# Patient Record
Sex: FEMALE | Race: WHITE | NOT HISPANIC OR LATINO | Employment: OTHER | URBAN - METROPOLITAN AREA
[De-identification: names, ages, dates, MRNs, and addresses within clinical notes are randomized per-mention and may not be internally consistent; named-entity substitution may affect disease eponyms.]

---

## 2017-01-03 ENCOUNTER — HOSPITAL ENCOUNTER (OUTPATIENT)
Dept: INFUSION THERAPY | Facility: HOSPITAL | Age: 62
Discharge: HOME | End: 2017-01-03
Admitting: INTERNAL MEDICINE

## 2017-01-03 VITALS
TEMPERATURE: 98.3 F | OXYGEN SATURATION: 97 % | DIASTOLIC BLOOD PRESSURE: 80 MMHG | RESPIRATION RATE: 20 BRPM | BODY MASS INDEX: 29.08 KG/M2 | HEART RATE: 58 BPM | SYSTOLIC BLOOD PRESSURE: 128 MMHG | WEIGHT: 151.9 LBS

## 2017-01-03 DIAGNOSIS — K50.80 CROHN'S DISEASE OF BOTH SMALL AND LARGE INTESTINE WITHOUT COMPLICATION (HCC): ICD-10-CM

## 2017-01-03 PROCEDURE — 96365 THER/PROPH/DIAG IV INF INIT: CPT

## 2017-01-03 PROCEDURE — 96415 CHEMO IV INFUSION ADDL HR: CPT

## 2017-01-03 PROCEDURE — 25010000002 INFLIXIMAB PER 10 MG: Performed by: INTERNAL MEDICINE

## 2017-01-03 PROCEDURE — 96366 THER/PROPH/DIAG IV INF ADDON: CPT

## 2017-01-03 PROCEDURE — 96413 CHEMO IV INFUSION 1 HR: CPT

## 2017-01-03 RX ADMIN — INFLIXIMAB 400 MG: 100 INJECTION, POWDER, LYOPHILIZED, FOR SOLUTION INTRAVENOUS at 14:39

## 2017-01-25 ENCOUNTER — OFFICE VISIT (OUTPATIENT)
Dept: GASTROENTEROLOGY | Facility: CLINIC | Age: 62
End: 2017-01-25

## 2017-01-25 VITALS
BODY MASS INDEX: 29.45 KG/M2 | DIASTOLIC BLOOD PRESSURE: 76 MMHG | WEIGHT: 150 LBS | SYSTOLIC BLOOD PRESSURE: 128 MMHG | HEIGHT: 60 IN

## 2017-01-25 DIAGNOSIS — K50.80 CROHN'S DISEASE OF BOTH SMALL AND LARGE INTESTINE WITHOUT COMPLICATION (HCC): Primary | ICD-10-CM

## 2017-01-25 DIAGNOSIS — E53.8 VITAMIN B12 DEFICIENCY: ICD-10-CM

## 2017-01-25 PROCEDURE — 99213 OFFICE O/P EST LOW 20 MIN: CPT | Performed by: INTERNAL MEDICINE

## 2017-01-25 NOTE — PROGRESS NOTES
Chief Complaint   Patient presents with   • Crohn's Disease       Daisy Escobar is a  61 y.o. female here for a follow up visit for Crohn's disease that was diagnosed in 1979. 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 She was followed by Dr. Abdoulaye uTrcios. She was hospitalized at one point due to gallstones and was found to have what sounds like a partial small bowel obstruction. She was hospitalized for a month. She ended up having a cholecystectomy colon resection appendectomy and removal of an ovarian cyst during that hospitalization. She has not been on any medication for her bowels. She has not been seen for follow-up since 1993 after her surgery.      Since her last visit she's undergone a colonoscopy-path is as follows:  1: RIGHT COLON NEAR ANASTOMOSIS:  COLONIC MUCOSA WITH MILD ARCHITECTURAL DISTORTION AND PATCHY INCREASE OF  MONONUCLEAR CELLS IN THE LAMINA PROPRIA, NONSPECIFIC.      2: ASCENDING COLON BIOPSY:  COLONIC MUCOSA WITH PATCHY MILD INCREASE OF MONONUCLEAR CELLS IN THE LAMINA PROPRIA  AND FOCAL EDEMA, NONSPECIFIC.      3: LEFT COLON:  COLONIC MUCOSA WITH PATCHY MILD INCREASE OF MONONUCLEAR CELLS IN THE LAMINA PROPRIA  AND PATCHY EDEMA, NONSPECIFIC.      She has also undergone a small bowel follow-through:  CONCLUSION: Multiple stenotic and intervening saccular segments of  distal small bowel compatible with Crohn's disease, along with  abnormalities in the distribution of transverse and proximal descending  colon. 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 Correlate with history. Consider CT abdomen and pelvis  scanning once the current barium has been completely eliminated from the  Bowel..      She has tried imuran. She developed  the acute onset of burning epigastric pain that was escalating - she was nauseated and had indigestion type symptoms. She contacted us and we advised her to hold imuran and the symptoms resolved - when checked a few days later lipase was mildly elevated and other labs nml. TB/Hep B normal in 9/16.    She has had first 2 remicade infusions at [facility] and tolerated them fine. She feels about the same- 5-6 BM/d - no blood.  Weight stable. Did not tolerate entecort - made her feel like she had the flu.          HPI  Past Medical History   Diagnosis Date   • Crohn's disease      Past Surgical History   Procedure Laterality Date   • Colon resection     • Appendectomy     • Cholecystectomy open     • Ovarian cyst surgery     • Colonoscopy N/A 8/2/2016     stenotic edn to ileo colonic anastomsis, severe stenosis, a single erosion at colonic anastmosis, erythematous mucosa in colon, NBIH, patchy edema       Current Outpatient Prescriptions:   •  Calcium Carbonate (CALCIUM 600 PO), Take  by mouth., Disp: , Rfl:   •  Cyanocobalamin 1000 MCG/ML kit, Inject 1,000 mcg as directed every 30 (thirty) days., Disp: 1 kit, Rfl: 6  •  inFLIXimab (REMICADE) 100 MG injection, Infuse on wek 0,2 and 6 and then q 8 weeks thereafter, Disp: 34.3 mL, Rfl: 0  •  Multiple Vitamin (MULTI VITAMIN DAILY PO), Take  by mouth., Disp: , Rfl:   PRN Meds:.  Allergies   Allergen Reactions   • Azathioprine      Social History     Social History   • Marital status:      Spouse name: N/A   • Number of children: N/A   • Years of education: N/A     Occupational History   • Not on file.     Social History Main Topics   • Smoking status: Former Smoker     Packs/day: 0.25     Types: Cigarettes     Quit date: 1975   • Smokeless tobacco: Not on file   • Alcohol use No   • Drug use: No   • Sexual activity: Not on file     Other Topics Concern   • Not on file     Social History Narrative     History reviewed. No pertinent family history.  Review of Systems    Constitutional: Negative for chills, fever and unexpected weight change.   Respiratory: Negative for cough and shortness of breath.    Genitourinary: Negative for dysuria.     Vitals:    01/25/17 1336   BP: 128/76     Last Weight    01/25/17  1336   Weight: 150 lb (68 kg)     Physical Exam   Constitutional: She appears well-developed and well-nourished.   HENT:   Head: Normocephalic and atraumatic.   Eyes: No scleral icterus.   Abdominal: Soft. She exhibits no distension and no mass. There is no tenderness.   Neurological: She is alert.   Skin: Skin is warm and dry.   Psychiatric: She has a normal mood and affect.     No images are attached to the encounter.  Diagnoses and all orders for this visit:    Crohn's disease of both small and large intestine without complication  -     CBC & Differential  -     Comprehensive Metabolic Panel  -     Vitamin B12  -     Vitamin D 25 Hydroxy    Vitamin B12 deficiency    Plan:  - cont remicade - third dose next week  - UTD on vaccinations  - needs to derm for skin check  - labs today  - cont b12 replacement

## 2017-01-25 NOTE — MR AVS SNAPSHOT
Daisy Escobar   1/25/2017 1:30 PM   Office Visit    Dept Phone:  [phone]   Encounter #:  [id number]    Provider:  Patricia Patel MD   Department:  [facility]                Your Full Care Plan              Today's Medication Changes          These changes are accurate as of: 1/25/17  2:26 PM.  If you have any questions, ask your nurse or doctor.               Stop taking medication(s)listed here:     Budesonide 3 MG 24 hr capsule   Commonly known as:  ENTOCORT EC   Stopped by:  Patricia Patel MD                      Your Updated Medication List          This list is accurate as of: 1/25/17  2:26 PM.  Always use your most recent med list.                CALCIUM 600 PO       Cyanocobalamin 1000 MCG/ML kit   Inject 1,000 mcg as directed every 30 (thirty) days.       inFLIXimab 100 MG injection   Commonly known as:  REMICADE   Infuse on wek 0,2 and 6 and then q 8 weeks thereafter       MULTI VITAMIN DAILY PO               We Performed the Following     CBC & Differential     Comprehensive Metabolic Panel     Vitamin B12     Vitamin D 25 Hydroxy       You Were Diagnosed With        Codes Comments    Crohn's disease of both small and large intestine without complication    -  Primary ICD-10-CM: K50.80  ICD-9-CM: 555.2     Vitamin B12 deficiency     ICD-10-CM: E53.8  ICD-9-CM: 266.2       Instructions     None    Patient Instructions History      Upcoming Appointments     Visit Type Date Time Department    FOLLOW UP 1/25/2017  1:30 PM [facility]    REMICADE 2/1/2017  9:00 AM  [facility] INFU NON ONC    FOLLOW UP 4/27/2017 10:45 AM [facility]      MyChart Signup     Our records indicate that you have an active [employer] account.    You can view your After Visit Summary by going to [employer] and logging in with your [employer] username and password.  If you don't have a [employer] username and password but a parent  "or guardian has access to your record, the parent or guardian should login with their own [employer] username and password and access your record to view the After Visit Summary.    If you have questions, you can email [email or web address] or call [phone] to talk to our [employer] staff.  Remember, [employer] is NOT to be used for urgent needs.  For medical emergencies, dial 911.               Other Info from Your Visit           Your Appointments     Feb 01, 2017  9:00 AM EST   Remicade with ROOM 2 [facility]    [address] [address] [phone]            Apr 27, 2017 10:45 AM EDT   Follow Up with Patricia Patel MD   [facility] (--)    [address]  [facility] [place] [address] [phone]           Arrive 15 minutes prior to appointment.              Allergies     Azathioprine        Reason for Visit     Crohn's Disease           Vital Signs     Blood Pressure Height Weight Body Mass Index Smoking Status       128/76 60\" (152.4 cm) 150 lb (68 kg) 29.29 kg/m2 Former Smoker       Problems and Diagnoses Noted     Inflammatory bowel disease (Crohn's disease)    Vitamin B12 deficiency        "

## 2017-01-26 LAB
25(OH)D3+25(OH)D2 SERPL-MCNC: 28.8 NG/ML (ref 30–100)
ALBUMIN SERPL-MCNC: 4.4 G/DL (ref 3.5–5.2)
ALBUMIN/GLOB SERPL: 1.6 G/DL
ALP SERPL-CCNC: 53 U/L (ref 39–117)
ALT SERPL-CCNC: 17 U/L (ref 1–33)
AMYLASE SERPL-CCNC: 44 U/L (ref 28–100)
AST SERPL-CCNC: 15 U/L (ref 1–32)
BASOPHILS # BLD AUTO: 0.03 10*3/MM3 (ref 0–0.2)
BASOPHILS NFR BLD AUTO: 0.5 % (ref 0–1.5)
BILIRUB SERPL-MCNC: 1.5 MG/DL (ref 0.1–1.2)
BUN SERPL-MCNC: 10 MG/DL (ref 8–23)
BUN/CREAT SERPL: 12.5 (ref 7–25)
CALCIUM SERPL-MCNC: 9.9 MG/DL (ref 8.6–10.5)
CHLORIDE SERPL-SCNC: 100 MMOL/L (ref 98–107)
CO2 SERPL-SCNC: 26.9 MMOL/L (ref 22–29)
CREAT SERPL-MCNC: 0.8 MG/DL (ref 0.57–1)
EOSINOPHIL # BLD AUTO: 0.12 10*3/MM3 (ref 0–0.7)
EOSINOPHIL NFR BLD AUTO: 2 % (ref 0.3–6.2)
ERYTHROCYTE [DISTWIDTH] IN BLOOD BY AUTOMATED COUNT: 13.6 % (ref 11.7–13)
GLOBULIN SER CALC-MCNC: 2.8 GM/DL
GLUCOSE SERPL-MCNC: 98 MG/DL (ref 65–99)
HCT VFR BLD AUTO: 43.6 % (ref 35.6–45.5)
HGB BLD-MCNC: 14.6 G/DL (ref 11.9–15.5)
IMM GRANULOCYTES # BLD: 0 10*3/MM3 (ref 0–0.03)
IMM GRANULOCYTES NFR BLD: 0 % (ref 0–0.5)
LIPASE SERPL-CCNC: 48 U/L (ref 13–60)
LYMPHOCYTES # BLD AUTO: 2.04 10*3/MM3 (ref 0.9–4.8)
LYMPHOCYTES NFR BLD AUTO: 33.7 % (ref 19.6–45.3)
MCH RBC QN AUTO: 29.4 PG (ref 26.9–32)
MCHC RBC AUTO-ENTMCNC: 33.5 G/DL (ref 32.4–36.3)
MCV RBC AUTO: 87.7 FL (ref 80.5–98.2)
MONOCYTES # BLD AUTO: 0.33 10*3/MM3 (ref 0.2–1.2)
MONOCYTES NFR BLD AUTO: 5.5 % (ref 5–12)
NEUTROPHILS # BLD AUTO: 3.53 10*3/MM3 (ref 1.9–8.1)
NEUTROPHILS NFR BLD AUTO: 58.3 % (ref 42.7–76)
PLATELET # BLD AUTO: 248 10*3/MM3 (ref 140–500)
POTASSIUM SERPL-SCNC: 4.1 MMOL/L (ref 3.5–5.2)
PROT SERPL-MCNC: 7.2 G/DL (ref 6–8.5)
RBC # BLD AUTO: 4.97 10*6/MM3 (ref 3.9–5.2)
SODIUM SERPL-SCNC: 141 MMOL/L (ref 136–145)
VIT B12 SERPL-MCNC: 247 PG/ML (ref 211–946)
WBC # BLD AUTO: 6.05 10*3/MM3 (ref 4.5–10.7)

## 2017-01-27 ENCOUNTER — TELEPHONE (OUTPATIENT)
Dept: GASTROENTEROLOGY | Facility: CLINIC | Age: 62
End: 2017-01-27

## 2017-01-27 NOTE — TELEPHONE ENCOUNTER
Called pt and on vm advised per Dr Patel that her labs are normal and to call with any questions.

## 2017-01-30 ENCOUNTER — TELEPHONE (OUTPATIENT)
Dept: GASTROENTEROLOGY | Facility: CLINIC | Age: 62
End: 2017-01-30

## 2017-01-30 NOTE — TELEPHONE ENCOUNTER
Called pt and pt is asking about a bill regarding her remicaid in fusion at [facility].  Advised the pt to call [facility] billing.  Pt verb understanding.

## 2017-01-30 NOTE — TELEPHONE ENCOUNTER
----- Message from Jan Luis sent at 1/30/2017  8:58 AM EST -----  Regarding: PT CALLED ABOUT INSURNACE  Contact: [phone]

## 2017-02-01 ENCOUNTER — HOSPITAL ENCOUNTER (OUTPATIENT)
Dept: INFUSION THERAPY | Facility: HOSPITAL | Age: 62
Discharge: HOME | End: 2017-02-01
Admitting: INTERNAL MEDICINE

## 2017-02-01 VITALS
HEART RATE: 80 BPM | TEMPERATURE: 97.6 F | BODY MASS INDEX: 29.29 KG/M2 | RESPIRATION RATE: 20 BRPM | OXYGEN SATURATION: 96 % | SYSTOLIC BLOOD PRESSURE: 110 MMHG | DIASTOLIC BLOOD PRESSURE: 73 MMHG | WEIGHT: 150 LBS

## 2017-02-01 DIAGNOSIS — K50.80 CROHN'S DISEASE OF BOTH SMALL AND LARGE INTESTINE WITHOUT COMPLICATION (HCC): ICD-10-CM

## 2017-02-01 PROCEDURE — 96366 THER/PROPH/DIAG IV INF ADDON: CPT

## 2017-02-01 PROCEDURE — 25010000002 INFLIXIMAB PER 10 MG: Performed by: INTERNAL MEDICINE

## 2017-02-01 PROCEDURE — 96413 CHEMO IV INFUSION 1 HR: CPT

## 2017-02-01 PROCEDURE — 96365 THER/PROPH/DIAG IV INF INIT: CPT

## 2017-02-01 PROCEDURE — 96415 CHEMO IV INFUSION ADDL HR: CPT

## 2017-02-01 RX ADMIN — INFLIXIMAB 400 MG: 100 INJECTION, POWDER, LYOPHILIZED, FOR SOLUTION INTRAVENOUS at 09:53

## 2017-03-17 RX ORDER — CYANOCOBALAMIN 1000 UG/ML
INJECTION, SOLUTION INTRAMUSCULAR; SUBCUTANEOUS
Qty: 1 ML | Refills: 5 | Status: SHIPPED | OUTPATIENT
Start: 2017-03-17 | End: 2018-06-28

## 2017-03-27 ENCOUNTER — TELEPHONE (OUTPATIENT)
Dept: GASTROENTEROLOGY | Facility: CLINIC | Age: 62
End: 2017-03-27

## 2017-03-27 NOTE — TELEPHONE ENCOUNTER
----- Message from Maximo Bell sent at 3/27/2017 12:10 PM EDT -----  Regarding: Remicade infusion  Contact: [phone]  Cliff TORRES is calling regarding authorization for Remicade infusion on 3/29.

## 2017-03-27 NOTE — TELEPHONE ENCOUNTER
Called [phone] and spoke with Mitzi and obtained pre cert for Remicaid 5mg / kg Iv every 8 wks.  Auth number is [id number] and is good from 03/29/2017- 02/28/2018.    Called Cliff and advised of the above

## 2017-03-29 ENCOUNTER — HOSPITAL ENCOUNTER (OUTPATIENT)
Dept: INFUSION THERAPY | Facility: HOSPITAL | Age: 62
Discharge: HOME | End: 2017-03-29
Admitting: INTERNAL MEDICINE

## 2017-03-29 VITALS
HEART RATE: 67 BPM | BODY MASS INDEX: 30.14 KG/M2 | TEMPERATURE: 96.6 F | OXYGEN SATURATION: 98 % | RESPIRATION RATE: 20 BRPM | SYSTOLIC BLOOD PRESSURE: 125 MMHG | WEIGHT: 154.32 LBS | DIASTOLIC BLOOD PRESSURE: 72 MMHG

## 2017-03-29 DIAGNOSIS — K50.80 CROHN'S DISEASE OF BOTH SMALL AND LARGE INTESTINE WITHOUT COMPLICATION (HCC): ICD-10-CM

## 2017-03-29 PROCEDURE — 96415 CHEMO IV INFUSION ADDL HR: CPT

## 2017-03-29 PROCEDURE — 96365 THER/PROPH/DIAG IV INF INIT: CPT

## 2017-03-29 PROCEDURE — 96366 THER/PROPH/DIAG IV INF ADDON: CPT

## 2017-03-29 PROCEDURE — 96413 CHEMO IV INFUSION 1 HR: CPT

## 2017-03-29 PROCEDURE — 25010000002 INFLIXIMAB PER 10 MG: Performed by: INTERNAL MEDICINE

## 2017-03-29 RX ADMIN — INFLIXIMAB 400 MG: 100 INJECTION, POWDER, LYOPHILIZED, FOR SOLUTION INTRAVENOUS at 09:30

## 2017-03-30 PROCEDURE — 25010000002 INFLIXIMAB PER 10 MG: Performed by: INTERNAL MEDICINE

## 2017-04-27 ENCOUNTER — TELEPHONE (OUTPATIENT)
Dept: GASTROENTEROLOGY | Facility: CLINIC | Age: 62
End: 2017-04-27

## 2017-04-27 ENCOUNTER — OFFICE VISIT (OUTPATIENT)
Dept: GASTROENTEROLOGY | Facility: CLINIC | Age: 62
End: 2017-04-27

## 2017-04-27 VITALS
HEIGHT: 60 IN | WEIGHT: 153.4 LBS | BODY MASS INDEX: 30.12 KG/M2 | SYSTOLIC BLOOD PRESSURE: 124 MMHG | DIASTOLIC BLOOD PRESSURE: 72 MMHG

## 2017-04-27 DIAGNOSIS — E53.8 VITAMIN B12 DEFICIENCY: ICD-10-CM

## 2017-04-27 DIAGNOSIS — K50.00 CROHN'S DISEASE OF SMALL INTESTINE WITHOUT COMPLICATION (HCC): Primary | ICD-10-CM

## 2017-04-27 DIAGNOSIS — E55.9 VITAMIN D DEFICIENCY: ICD-10-CM

## 2017-04-27 DIAGNOSIS — M95.2 SKULL DEFORMITY: ICD-10-CM

## 2017-04-27 PROCEDURE — 99214 OFFICE O/P EST MOD 30 MIN: CPT | Performed by: INTERNAL MEDICINE

## 2017-04-27 RX ORDER — ERGOCALCIFEROL 1.25 MG/1
50000 CAPSULE ORAL WEEKLY
Qty: 4 CAPSULE | Refills: 11 | Status: SHIPPED | OUTPATIENT
Start: 2017-04-27 | End: 2017-11-08 | Stop reason: ALTCHOICE

## 2017-04-27 NOTE — TELEPHONE ENCOUNTER
It is reported that pt copay for Remicade is $3500/yr, and $150 for each infusion.  Call to [facility] @ [phone] and spoke with Lucy JONES to check expense.  Lucy esqueda may be due to deductible - check with plan benefits.      Call to [facility] @ [phone] - on hold for 15 min - no answer.    Call to [facility] and spoke with Ina - she reports that with DOS 1/3/17 - pt's cost of $2750 counted toward deductible.  $290.99 is pt's copay.  On 4/13/17 - pt's coinsurance was $703.49.    Call to pt.  She reports her deductible is $3500, and then 80/20 %.  Instruct pt to contact insurance to ascertain expenses and notify this office.  Pt states will do so.    VM to Remicade Reji adams, @ [phone] to check if any pt assistance program available.  Awaiting reply.

## 2017-04-27 NOTE — PROGRESS NOTES
Chief Complaint   Patient presents with   • Follow-up   • Crohn's Disease       Daisy Escobar is a  61 y.o. female here for a follow up visit for Crohn's disease that was diagnosed in 1979. 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 She was followed by Dr. Abdoulaye Turcios. She was hospitalized at one point due to gallstones and was found to have what sounds like a partial small bowel obstruction. She was hospitalized for a month. She ended up having a cholecystectomy colon resection appendectomy and removal of an ovarian cyst during that hospitalization. She has not been on any medication for her bowels. She has not been seen for follow-up since 1993 after her surgery.      Since her last visit she's undergone a colonoscopy-path is as follows:  1: RIGHT COLON NEAR ANASTOMOSIS:  COLONIC MUCOSA WITH MILD ARCHITECTURAL DISTORTION AND PATCHY INCREASE OF  MONONUCLEAR CELLS IN THE LAMINA PROPRIA, NONSPECIFIC.      2: ASCENDING COLON BIOPSY:  COLONIC MUCOSA WITH PATCHY MILD INCREASE OF MONONUCLEAR CELLS IN THE LAMINA PROPRIA  AND FOCAL EDEMA, NONSPECIFIC.      3: LEFT COLON:  COLONIC MUCOSA WITH PATCHY MILD INCREASE OF MONONUCLEAR CELLS IN THE LAMINA PROPRIA  AND PATCHY EDEMA, NONSPECIFIC.      She has also undergone a small bowel follow-through:  CONCLUSION: Multiple stenotic and intervening saccular segments of  distal small bowel compatible with Crohn's disease, along with  abnormalities in the distribution of transverse and proximal descending  colon. 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 Correlate with history. Consider CT abdomen and pelvis  scanning once the current barium has been completely eliminated from the  Bowel.       She has tried imuran.  She developed the acute onset of burning epigastric pain that was escalating - she was nauseated and had indigestion type symptoms. She contacted us and we advised her to hold imuran and the symptoms resolved - when checked a few days later lipase was mildly elevated and other labs nml. TB/Hep B normal in 9/16.   Did not tolerate entecort - made her feel like she had the flu.      She is completed the loading phase of Remicade.  She does feel like the urgency to have a bowel movement after eating has improved.  Her frequency is about the same.  She averages 5-6 bowel movements daily.  She has no abdominal pain.  Her weight is stable.  She's had no fevers or chills.  She did notice a palpable nodule on the back of her skull posterior to her left ear.  She's not sure how long it's been there.  It doesn't hurt when it's palpated.    HPI  Past Medical History:   Diagnosis Date   • Crohn's disease      Past Surgical History:   Procedure Laterality Date   • APPENDECTOMY     • CHOLECYSTECTOMY OPEN     • COLON RESECTION     • COLONOSCOPY N/A 8/2/2016    stenotic edn to ileo colonic anastomsis, severe stenosis, a single erosion at colonic anastmosis, erythematous mucosa in colon, NBIH, patchy edema   • OVARIAN CYST SURGERY         Current Outpatient Prescriptions:   •  Calcium Carbonate (CALCIUM 600 PO), Take  by mouth Daily., Disp: , Rfl:   •  cyanocobalamin 1000 MCG/ML injection, INJECT 1 ML AS DIRECTED ONCE A MONTH, Disp: 1 mL, Rfl: 5  •  inFLIXimab (REMICADE) 100 MG injection, Infuse on wek 0,2 and 6 and then q 8 weeks thereafter, Disp: 34.3 mL, Rfl: 0  •  Multiple Vitamin (MULTI VITAMIN DAILY PO), Take  by mouth Daily., Disp: , Rfl:   •  vitamin D (ERGOCALCIFEROL) 09158 UNITS capsule capsule, Take 1 capsule by mouth 1 (One) Time Per Week., Disp: 4 capsule, Rfl: 11  PRN Meds:.  Allergies   Allergen Reactions   • Azathioprine      Social History     Social History   • Marital status:      Spouse name: N/A   •  Number of children: N/A   • Years of education: N/A     Occupational History   • Not on file.     Social History Main Topics   • Smoking status: Former Smoker     Packs/day: 0.25     Types: Cigarettes     Quit date: 1975   • Smokeless tobacco: Not on file   • Alcohol use No   • Drug use: No   • Sexual activity: Defer     Other Topics Concern   • Not on file     Social History Narrative     History reviewed. No pertinent family history.  Review of Systems   Constitutional: Negative for fever and unexpected weight change.   Respiratory: Negative for cough and shortness of breath.    Gastrointestinal: Positive for diarrhea. Negative for blood in stool.     Vitals:    04/27/17 1034   BP: 124/72     Last Weight    04/27/17  1034   Weight: 153 lb 6.4 oz (69.6 kg)     Physical Exam   Constitutional: She is oriented to person, place, and time. She appears well-developed and well-nourished.   HENT:   Head: Normocephalic and atraumatic.   2 cm hard nodule posterior to her left ear, nonmobile nontender   Eyes: Conjunctivae are normal. No scleral icterus.   Neck: Normal range of motion. Neck supple.   Pulmonary/Chest: Effort normal.   Abdominal: Soft. Bowel sounds are normal. She exhibits no distension. There is no tenderness.   Musculoskeletal: She exhibits no edema.   Neurological: She is alert and oriented to person, place, and time.   Skin: Skin is warm and dry.   Psychiatric: She has a normal mood and affect.   Nursing note and vitals reviewed.    No images are attached to the encounter.  Diagnoses and all orders for this visit:    Crohn's disease of small intestine without complication    Skull deformity  -     Cancel: CT head wo contrast; Future  -     XR skull < 4 vw; Future    Vitamin D deficiency    Vitamin B12 deficiency    Other orders  -     vitamin D (ERGOCALCIFEROL) 04600 UNITS capsule capsule; Take 1 capsule by mouth 1 (One) Time Per Week.      Plan-  - Continue Remicade for now.  We may have to find an  alternate biologic as this medication is costing her an extraordinary amount of money.  I will have my nurse is looking into whether this is being built appropriately or if there is any cheaper way to have this medication delivered to her.  Otherwise we may have to consider switching her to Cimzia or Humira.  - We'll start weekly vitamin D replacement.  Continue vitamin B12 replacement.  - Going to get an x-ray of her skull to further evaluate the nodule

## 2017-04-27 NOTE — TELEPHONE ENCOUNTER
Reji brought info re: [facility] .  Call to  and spoke with Bita.  She requests that saving paperwork be completed.  2016/2017 savings program form downloaded.  Call to pt.  Authorization received to sign for pt.  Form completed and faxed to  - confirmation received.

## 2017-05-08 ENCOUNTER — HOSPITAL ENCOUNTER (OUTPATIENT)
Dept: GENERAL RADIOLOGY | Facility: HOSPITAL | Age: 62
Discharge: HOME | End: 2017-05-08
Attending: INTERNAL MEDICINE | Admitting: INTERNAL MEDICINE

## 2017-05-08 ENCOUNTER — APPOINTMENT (OUTPATIENT)
Dept: WOMENS IMAGING | Facility: HOSPITAL | Age: 62
End: 2017-05-08

## 2017-05-08 DIAGNOSIS — M95.2 SKULL DEFORMITY: ICD-10-CM

## 2017-05-08 PROCEDURE — 70250 X-RAY EXAM OF SKULL: CPT

## 2017-05-08 PROCEDURE — 77067 SCR MAMMO BI INCL CAD: CPT | Performed by: RADIOLOGY

## 2017-05-09 ENCOUNTER — TELEPHONE (OUTPATIENT)
Dept: GASTROENTEROLOGY | Facility: CLINIC | Age: 62
End: 2017-05-09

## 2017-05-24 ENCOUNTER — HOSPITAL ENCOUNTER (OUTPATIENT)
Dept: INFUSION THERAPY | Facility: HOSPITAL | Age: 62
Discharge: HOME | End: 2017-05-24
Admitting: INTERNAL MEDICINE

## 2017-05-24 VITALS
RESPIRATION RATE: 16 BRPM | HEART RATE: 69 BPM | BODY MASS INDEX: 30.33 KG/M2 | SYSTOLIC BLOOD PRESSURE: 134 MMHG | HEIGHT: 60 IN | OXYGEN SATURATION: 98 % | WEIGHT: 154.5 LBS | DIASTOLIC BLOOD PRESSURE: 66 MMHG | TEMPERATURE: 96.9 F

## 2017-05-24 DIAGNOSIS — K50.80 CROHN'S DISEASE OF BOTH SMALL AND LARGE INTESTINE WITHOUT COMPLICATION (HCC): ICD-10-CM

## 2017-05-24 PROCEDURE — 96365 THER/PROPH/DIAG IV INF INIT: CPT

## 2017-05-24 PROCEDURE — 96413 CHEMO IV INFUSION 1 HR: CPT

## 2017-05-24 PROCEDURE — 25010000002 INFLIXIMAB PER 10 MG: Performed by: INTERNAL MEDICINE

## 2017-05-24 PROCEDURE — 96366 THER/PROPH/DIAG IV INF ADDON: CPT

## 2017-05-24 PROCEDURE — 96415 CHEMO IV INFUSION ADDL HR: CPT

## 2017-05-24 RX ADMIN — INFLIXIMAB 300 MG: 100 INJECTION, POWDER, LYOPHILIZED, FOR SOLUTION INTRAVENOUS at 09:10

## 2017-05-25 PROCEDURE — 25010000002 INFLIXIMAB PER 10 MG: Performed by: INTERNAL MEDICINE

## 2017-07-19 ENCOUNTER — HOSPITAL ENCOUNTER (OUTPATIENT)
Dept: INFUSION THERAPY | Facility: HOSPITAL | Age: 62
Discharge: HOME | End: 2017-07-19
Admitting: INTERNAL MEDICINE

## 2017-07-19 VITALS
HEART RATE: 63 BPM | HEIGHT: 60 IN | DIASTOLIC BLOOD PRESSURE: 78 MMHG | SYSTOLIC BLOOD PRESSURE: 133 MMHG | OXYGEN SATURATION: 96 % | TEMPERATURE: 96.6 F | BODY MASS INDEX: 30.98 KG/M2 | WEIGHT: 157.8 LBS | RESPIRATION RATE: 12 BRPM

## 2017-07-19 DIAGNOSIS — K50.80 CROHN'S DISEASE OF BOTH SMALL AND LARGE INTESTINE WITHOUT COMPLICATION (HCC): ICD-10-CM

## 2017-07-19 PROCEDURE — 96415 CHEMO IV INFUSION ADDL HR: CPT

## 2017-07-19 PROCEDURE — 96365 THER/PROPH/DIAG IV INF INIT: CPT

## 2017-07-19 PROCEDURE — 25010000002 INFLIXIMAB PER 10 MG: Performed by: INTERNAL MEDICINE

## 2017-07-19 PROCEDURE — 96413 CHEMO IV INFUSION 1 HR: CPT

## 2017-07-19 PROCEDURE — 96366 THER/PROPH/DIAG IV INF ADDON: CPT

## 2017-07-19 RX ADMIN — INFLIXIMAB 300 MG: 100 INJECTION, POWDER, LYOPHILIZED, FOR SOLUTION INTRAVENOUS at 08:40

## 2017-09-06 ENCOUNTER — OFFICE VISIT (OUTPATIENT)
Dept: GASTROENTEROLOGY | Facility: CLINIC | Age: 62
End: 2017-09-06

## 2017-09-06 VITALS
SYSTOLIC BLOOD PRESSURE: 134 MMHG | HEIGHT: 60 IN | BODY MASS INDEX: 30.9 KG/M2 | DIASTOLIC BLOOD PRESSURE: 82 MMHG | WEIGHT: 157.4 LBS | TEMPERATURE: 98.6 F

## 2017-09-06 DIAGNOSIS — E55.9 VITAMIN D DEFICIENCY DISEASE: ICD-10-CM

## 2017-09-06 DIAGNOSIS — R19.7 DIARRHEA, UNSPECIFIED TYPE: ICD-10-CM

## 2017-09-06 DIAGNOSIS — E53.8 VITAMIN B12 DEFICIENCY: ICD-10-CM

## 2017-09-06 DIAGNOSIS — K50.00 CROHN'S DISEASE OF SMALL INTESTINE WITHOUT COMPLICATION (HCC): Primary | ICD-10-CM

## 2017-09-06 PROCEDURE — 99214 OFFICE O/P EST MOD 30 MIN: CPT | Performed by: INTERNAL MEDICINE

## 2017-09-06 RX ORDER — DOXYCYCLINE HYCLATE 20 MG
TABLET ORAL
COMMUNITY
Start: 2017-07-31 | End: 2017-11-08

## 2017-09-06 NOTE — PROGRESS NOTES
Chief Complaint   Patient presents with   • Crohn's Disease       Daisy Escobar is a  62 y.o. female here for a follow up visit for Crohn's ileitis. She has been on Remicade now for about a year.  She doesn't notice much change since she started this medication.  She has about 5 bowel movements daily on average.  No blood in the stool.  Occasional urgency.  She sometimes has cramping.  Appetite is good.  No bloating.  She complains of sore joints specifically her elbows in her knees.  No recent can change or rash.  No recent infections, fevers chills.  She is up-to-date on her dermatologic evaluations.  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, severe stenosis, a single erosion at colonic anastmosis, erythematous mucosa in colon, NBIH, patchy edema   • OVARIAN CYST SURGERY         Current Outpatient Prescriptions:   •  Calcium Carbonate (CALCIUM 600 PO), Take  by mouth Daily., Disp: , Rfl:   •  cyanocobalamin 1000 MCG/ML injection, INJECT 1 ML AS DIRECTED ONCE A MONTH, Disp: 1 mL, Rfl: 5  •  inFLIXimab (REMICADE) 100 MG injection, Infuse on wek 0,2 and 6 and then q 8 weeks thereafter, Disp: 34.3 mL, Rfl: 0  •  vitamin D (ERGOCALCIFEROL) 34569 UNITS capsule capsule, Take 1 capsule by mouth 1 (One) Time Per Week., Disp: 4 capsule, Rfl: 11  •  doxycycline (PERIOSTAT) 20 MG tablet, , Disp: , Rfl:   •  hyoscyamine (LEVSIN) 0.125 MG SL tablet, Take 1 tablet by mouth Every 4 (Four) Hours As Needed for Cramping., Disp: 60 tablet, Rfl: 3  •  Multiple Vitamin (MULTI VITAMIN DAILY PO), Take  by mouth Daily., Disp: , Rfl:   PRN Meds:.  Allergies   Allergen Reactions   • Azathioprine      Social History     Social History   • Marital status:      Spouse name: N/A   • Number of children: N/A   • Years  of education: N/A     Occupational History   • Not on file.     Social History Main Topics   • Smoking status: Former Smoker     Packs/day: 0.25     Types: Cigarettes     Quit date: 1975   • Smokeless tobacco: Not on file   • Alcohol use No   • Drug use: No   • Sexual activity: Defer     Other Topics Concern   • Not on file     Social History Narrative     History reviewed. No pertinent family history.  Review of Systems   Constitutional: Negative for appetite change, chills, fever and unexpected weight change.   Eyes: Negative.    Gastrointestinal: Positive for diarrhea. Negative for abdominal pain and blood in stool.   Musculoskeletal: Positive for arthralgias.   Skin: Negative.    All other systems reviewed and are negative.    Vitals:    09/06/17 1455   BP: 134/82   Temp: 98.6 °F (37 °C)     Last Weight    09/06/17  1455   Weight: 157 lb 6.4 oz (71.4 kg)     Physical Exam   Constitutional: She appears well-developed and well-nourished.   HENT:   Head: Normocephalic and atraumatic.   Eyes: No scleral icterus.   Abdominal: Soft. She exhibits no distension and no mass. There is no tenderness.   Neurological: She is alert.   Skin: Skin is warm and dry.   Psychiatric: She has a normal mood and affect.     No images are attached to the encounter.  Diagnoses and all orders for this visit:    Crohn's disease of small intestine without complication  -     CBC & Differential  -     Comprehensive Metabolic Panel  -     Vitamin B12  -     Vitamin D 25 Hydroxy  -     QuantiFERON TB Gold  -     Hepatitis B Surface Antigen  -     FL small bowel follow through; Future    Vitamin D deficiency disease    Vitamin B12 deficiency    Diarrhea, unspecified type    Other orders  -     doxycycline (PERIOSTAT) 20 MG tablet;   -     hyoscyamine (LEVSIN) 0.125 MG SL tablet; Take 1 tablet by mouth Every 4 (Four) Hours As Needed for Cramping.       Plan:  - get flu vaccine when available  - schedule dexa (no prior evaluation)  - continue Vit  D  - labs today  - repeat sbft to reassess bowel disease (1 year on remicade)  - repeat TB/Hep B tests

## 2017-09-10 LAB
25(OH)D3+25(OH)D2 SERPL-MCNC: 44.3 NG/ML (ref 30–100)
ALBUMIN SERPL-MCNC: 4.4 G/DL (ref 3.6–4.8)
ALBUMIN/GLOB SERPL: 1.6 {RATIO} (ref 1.2–2.2)
ALP SERPL-CCNC: 58 IU/L (ref 39–117)
ALT SERPL-CCNC: 19 IU/L (ref 0–32)
ANNOTATION COMMENT IMP: NORMAL
AST SERPL-CCNC: 18 IU/L (ref 0–40)
BASOPHILS # BLD AUTO: 0 X10E3/UL (ref 0–0.2)
BASOPHILS NFR BLD AUTO: 0 %
BILIRUB SERPL-MCNC: 1.5 MG/DL (ref 0–1.2)
BUN SERPL-MCNC: 10 MG/DL (ref 8–27)
BUN/CREAT SERPL: 13 (ref 12–28)
CALCIUM SERPL-MCNC: 9.4 MG/DL (ref 8.7–10.3)
CHLORIDE SERPL-SCNC: 99 MMOL/L (ref 96–106)
CO2 SERPL-SCNC: 22 MMOL/L (ref 18–29)
CREAT SERPL-MCNC: 0.79 MG/DL (ref 0.57–1)
EOSINOPHIL # BLD AUTO: 0.1 X10E3/UL (ref 0–0.4)
EOSINOPHIL NFR BLD AUTO: 2 %
ERYTHROCYTE [DISTWIDTH] IN BLOOD BY AUTOMATED COUNT: 14 % (ref 12.3–15.4)
GAMMA INTERFERON BACKGROUND BLD IA-ACNC: 0.08 IU/ML
GLOBULIN SER CALC-MCNC: 2.8 G/DL (ref 1.5–4.5)
GLUCOSE SERPL-MCNC: 102 MG/DL (ref 65–99)
HBV SURFACE AG SERPL QL IA: NEGATIVE
HCT VFR BLD AUTO: 39.7 % (ref 34–46.6)
HGB BLD-MCNC: 13.8 G/DL (ref 11.1–15.9)
IMM GRANULOCYTES # BLD: 0 X10E3/UL (ref 0–0.1)
IMM GRANULOCYTES NFR BLD: 0 %
LYMPHOCYTES # BLD AUTO: 1.6 X10E3/UL (ref 0.7–3.1)
LYMPHOCYTES NFR BLD AUTO: 28 %
M TB IFN-G BLD-IMP: NEGATIVE
M TB IFN-G CD4+ BCKGRND COR BLD-ACNC: 0.23 IU/ML
M TB IFN-G CD4+ T-CELLS BLD-ACNC: 0.31 IU/ML
MCH RBC QN AUTO: 29.5 PG (ref 26.6–33)
MCHC RBC AUTO-ENTMCNC: 34.8 G/DL (ref 31.5–35.7)
MCV RBC AUTO: 85 FL (ref 79–97)
MITOGEN IGNF BLD-ACNC: >10 IU/ML
MONOCYTES # BLD AUTO: 0.4 X10E3/UL (ref 0.1–0.9)
MONOCYTES NFR BLD AUTO: 7 %
NEUTROPHILS # BLD AUTO: 3.7 X10E3/UL (ref 1.4–7)
NEUTROPHILS NFR BLD AUTO: 63 %
PLATELET # BLD AUTO: 240 X10E3/UL (ref 150–379)
POTASSIUM SERPL-SCNC: 3.8 MMOL/L (ref 3.5–5.2)
PROT SERPL-MCNC: 7.2 G/DL (ref 6–8.5)
QUANTIFERON INCUBATION: NORMAL
RBC # BLD AUTO: 4.68 X10E6/UL (ref 3.77–5.28)
SERVICE CMNT-IMP: NORMAL
SODIUM SERPL-SCNC: 140 MMOL/L (ref 134–144)
VIT B12 SERPL-MCNC: 697 PG/ML (ref 211–946)
WBC # BLD AUTO: 5.8 X10E3/UL (ref 3.4–10.8)

## 2017-09-13 ENCOUNTER — HOSPITAL ENCOUNTER (OUTPATIENT)
Dept: INFUSION THERAPY | Facility: HOSPITAL | Age: 62
Discharge: HOME | End: 2017-09-13
Admitting: INTERNAL MEDICINE

## 2017-09-13 VITALS
RESPIRATION RATE: 20 BRPM | TEMPERATURE: 95.5 F | OXYGEN SATURATION: 96 % | HEART RATE: 64 BPM | BODY MASS INDEX: 30.66 KG/M2 | SYSTOLIC BLOOD PRESSURE: 136 MMHG | WEIGHT: 157 LBS | DIASTOLIC BLOOD PRESSURE: 81 MMHG

## 2017-09-13 DIAGNOSIS — K50.80 CROHN'S DISEASE OF BOTH SMALL AND LARGE INTESTINE WITHOUT COMPLICATION (HCC): ICD-10-CM

## 2017-09-13 PROCEDURE — 96366 THER/PROPH/DIAG IV INF ADDON: CPT

## 2017-09-13 PROCEDURE — 25010000002 INFLIXIMAB PER 10 MG: Performed by: INTERNAL MEDICINE

## 2017-09-13 PROCEDURE — 96365 THER/PROPH/DIAG IV INF INIT: CPT

## 2017-09-13 RX ADMIN — INFLIXIMAB 400 MG: 100 INJECTION, POWDER, LYOPHILIZED, FOR SOLUTION INTRAVENOUS at 09:09

## 2017-09-13 NOTE — PROGRESS NOTES
Pt tolerated infusion with no complaints or S/S of reaction.  Monitored for 30 minutes post infusion per protocol.  Pt D/C per ambulation @ 9270.

## 2017-09-14 PROCEDURE — 25010000002 INFLIXIMAB PER 10 MG: Performed by: INTERNAL MEDICINE

## 2017-09-18 ENCOUNTER — TELEPHONE (OUTPATIENT)
Dept: GASTROENTEROLOGY | Facility: CLINIC | Age: 62
End: 2017-09-18

## 2017-09-18 NOTE — TELEPHONE ENCOUNTER
Call from pt.  Advise per Dr Patel that labwork normal.  TB/Hep B negative.  Vitamin D has improved - can stop supplement for now.  Pt verb understanding.

## 2017-09-18 NOTE — TELEPHONE ENCOUNTER
----- Message from Patricia Patel MD sent at 9/18/2017 12:35 PM EDT -----  Labwork normal - TB/Hepatitis B negative.  Vitamin D has improved - she can stop supplement for now

## 2017-09-19 ENCOUNTER — APPOINTMENT (OUTPATIENT)
Dept: BONE DENSITY | Facility: HOSPITAL | Age: 62
End: 2017-09-19
Attending: INTERNAL MEDICINE

## 2017-09-19 ENCOUNTER — HOSPITAL ENCOUNTER (OUTPATIENT)
Dept: GENERAL RADIOLOGY | Facility: HOSPITAL | Age: 62
Discharge: HOME | End: 2017-09-19
Attending: INTERNAL MEDICINE | Admitting: INTERNAL MEDICINE

## 2017-09-19 DIAGNOSIS — K50.00 CROHN'S DISEASE OF SMALL INTESTINE WITHOUT COMPLICATION (HCC): ICD-10-CM

## 2017-09-19 PROCEDURE — 74250 X-RAY XM SM INT 1CNTRST STD: CPT

## 2017-09-26 ENCOUNTER — TELEPHONE (OUTPATIENT)
Dept: GASTROENTEROLOGY | Facility: CLINIC | Age: 62
End: 2017-09-26

## 2017-09-26 NOTE — TELEPHONE ENCOUNTER
----- Message from Patricia Patel MD sent at 9/20/2017 11:43 AM EDT -----  sbft with similar findings to previous eval - 2 strictured segments

## 2017-09-27 ENCOUNTER — HOSPITAL ENCOUNTER (OUTPATIENT)
Dept: BONE DENSITY | Facility: HOSPITAL | Age: 62
Discharge: HOME | End: 2017-09-27
Attending: INTERNAL MEDICINE | Admitting: INTERNAL MEDICINE

## 2017-09-27 DIAGNOSIS — K50.00 CROHN'S DISEASE OF SMALL INTESTINE WITHOUT COMPLICATION (HCC): ICD-10-CM

## 2017-09-27 DIAGNOSIS — E55.9 VITAMIN D DEFICIENCY DISEASE: ICD-10-CM

## 2017-09-27 PROCEDURE — 77080 DXA BONE DENSITY AXIAL: CPT

## 2017-09-27 NOTE — TELEPHONE ENCOUNTER
Call from pt.  ( verified).  Advise per Dr Brian that SBFT with similar findings to previous eval - 2 strictured segments.  Pt verb understanding.

## 2017-09-29 ENCOUNTER — TELEPHONE (OUTPATIENT)
Dept: GASTROENTEROLOGY | Facility: CLINIC | Age: 62
End: 2017-09-29

## 2017-09-29 NOTE — TELEPHONE ENCOUNTER
----- Message from Patricia Patel MD sent at 9/27/2017 11:01 AM EDT -----  DEXA scan shows some bone thinning (osteopenia) - rec calcium supp in addition to vid D supp

## 2017-11-08 ENCOUNTER — HOSPITAL ENCOUNTER (OUTPATIENT)
Dept: INFUSION THERAPY | Facility: HOSPITAL | Age: 62
Discharge: HOME | End: 2017-11-08
Admitting: INTERNAL MEDICINE

## 2017-11-08 VITALS
TEMPERATURE: 98 F | DIASTOLIC BLOOD PRESSURE: 76 MMHG | OXYGEN SATURATION: 98 % | SYSTOLIC BLOOD PRESSURE: 125 MMHG | BODY MASS INDEX: 30.86 KG/M2 | WEIGHT: 158 LBS | HEART RATE: 60 BPM | RESPIRATION RATE: 16 BRPM

## 2017-11-08 DIAGNOSIS — K50.80 CROHN'S DISEASE OF BOTH SMALL AND LARGE INTESTINE WITHOUT COMPLICATION (HCC): ICD-10-CM

## 2017-11-08 PROCEDURE — 96413 CHEMO IV INFUSION 1 HR: CPT

## 2017-11-08 PROCEDURE — 96415 CHEMO IV INFUSION ADDL HR: CPT

## 2017-11-08 PROCEDURE — 25010000002 INFLIXIMAB PER 10 MG: Performed by: INTERNAL MEDICINE

## 2017-11-08 RX ADMIN — INFLIXIMAB 400 MG: 100 INJECTION, POWDER, LYOPHILIZED, FOR SOLUTION INTRAVENOUS at 09:02

## 2017-11-08 NOTE — PROGRESS NOTES
Tolerated well, monitored for 30 minutes post infusion without incident. Warm blanket and Pepsi given. D/C'd per ambulation @ 2097.

## 2017-11-14 ENCOUNTER — TELEPHONE (OUTPATIENT)
Dept: GASTROENTEROLOGY | Facility: CLINIC | Age: 62
End: 2017-11-14

## 2017-11-14 DIAGNOSIS — K50.00 CROHN'S DISEASE OF SMALL INTESTINE WITHOUT COMPLICATION (HCC): Primary | ICD-10-CM

## 2017-11-14 NOTE — TELEPHONE ENCOUNTER
----- Message from Patricia Patel MD sent at 11/14/2017  9:47 AM EST -----  Regarding: new order for remicade  [facility] send new order for remicade 5 mg/kg every 8 weeks to [facility]    ----- Message -----     From: Chasity Garcia     Sent: 11/9/2017   7:59 AM       To: Patricia Patel MD

## 2018-01-03 ENCOUNTER — TELEPHONE (OUTPATIENT)
Dept: GASTROENTEROLOGY | Facility: CLINIC | Age: 63
End: 2018-01-03

## 2018-01-03 NOTE — TELEPHONE ENCOUNTER
----- Message from Konrad Baker sent at 1/3/2018  1:08 PM EST -----  Regarding: PROCEDURE QUESTIONS   Contact: [phone]  PT CALLED ABOUT A PROCEDURE  FOR TOMORROW

## 2018-01-04 ENCOUNTER — APPOINTMENT (OUTPATIENT)
Dept: ONCOLOGY | Facility: HOSPITAL | Age: 63
End: 2018-01-04
Attending: INTERNAL MEDICINE

## 2018-01-29 ENCOUNTER — TELEPHONE (OUTPATIENT)
Dept: GASTROENTEROLOGY | Facility: CLINIC | Age: 63
End: 2018-01-29

## 2018-01-29 NOTE — TELEPHONE ENCOUNTER
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  She's requesting medication changed to something that is more lifestyle family.  We discussed Humira and she is willing to proceed with this.  She's had previous adverse reaction to Imuran.  Her Crohn's involves the small bowel.    We'll begin the process of getting Humira approved for the patient.  We will get her set up for follow-up in 4-6 weeks after initiation of therapy.    Please start approval process for humira

## 2018-01-29 NOTE — TELEPHONE ENCOUNTER
----- Message from Jan Luis sent at 1/29/2018 10:28 AM EST -----  Regarding: PT CALLED  Contact: [phone]   PT IS CALLING STATING SHE HAS STOPPED REMICADE TREATMENT ABOUT 3 WEEKS AGO & PT IS CALLING TO SEE WHERE SHE GOES FROM HERE .

## 2018-01-29 NOTE — TELEPHONE ENCOUNTER
Call to pt.   was to have Remicade tx on 1/2, but decided not to proceed (see note of 1/3/18).  States Remicade is too time consuming, and inconvenient.  Would like to try something different.   is doing ok, but wonders if should be on something else.    Message to Dr Patel.

## 2018-01-30 NOTE — TELEPHONE ENCOUNTER
Forms faxed to [facility] @ [phone] - confirmation received.

## 2018-02-12 ENCOUNTER — TELEPHONE (OUTPATIENT)
Dept: GASTROENTEROLOGY | Facility: CLINIC | Age: 63
End: 2018-02-12

## 2018-02-12 NOTE — TELEPHONE ENCOUNTER
Called pt and pt reports she developed a uti on Saturday and began a wk's worth of antibiotics.  She is asking when can she start her humira.  Advised would send message to Dr Patel.   Pt verb understanding.

## 2018-02-12 NOTE — TELEPHONE ENCOUNTER
Called pt and advised per Dr Patel to complete her antibiotics and then she can begin her humira. Pt verb understanding.

## 2018-02-12 NOTE — TELEPHONE ENCOUNTER
----- Message from Jan Luis sent at 2/12/2018 12:46 PM EST -----  Regarding: PT CALLED   Contact: [phone]  PT IS CALLING ABOUT HER HUMIRA, BUT PT HAS A UTI NOW & WAS WONDERING WHEN SHE CAN START TAKING THE HUMIRA

## 2018-03-20 ENCOUNTER — OFFICE VISIT (OUTPATIENT)
Dept: GASTROENTEROLOGY | Facility: CLINIC | Age: 63
End: 2018-03-20

## 2018-03-20 ENCOUNTER — TELEPHONE (OUTPATIENT)
Dept: GASTROENTEROLOGY | Facility: CLINIC | Age: 63
End: 2018-03-20

## 2018-03-20 VITALS
BODY MASS INDEX: 29.48 KG/M2 | SYSTOLIC BLOOD PRESSURE: 120 MMHG | HEIGHT: 62 IN | WEIGHT: 160.2 LBS | TEMPERATURE: 98.3 F | DIASTOLIC BLOOD PRESSURE: 82 MMHG

## 2018-03-20 DIAGNOSIS — K50.80 CROHN'S DISEASE OF BOTH SMALL AND LARGE INTESTINE WITHOUT COMPLICATION (HCC): Primary | ICD-10-CM

## 2018-03-20 DIAGNOSIS — Z79.620 ADALIMUMAB (HUMIRA) LONG-TERM USE: ICD-10-CM

## 2018-03-20 LAB
ALBUMIN SERPL-MCNC: 4.6 G/DL (ref 3.5–5.2)
ALBUMIN/GLOB SERPL: 1.6 G/DL
ALP SERPL-CCNC: 57 U/L (ref 39–117)
ALT SERPL-CCNC: 18 U/L (ref 1–33)
AST SERPL-CCNC: 12 U/L (ref 1–32)
BASOPHILS # BLD AUTO: 0.04 10*3/MM3 (ref 0–0.2)
BASOPHILS NFR BLD AUTO: 0.6 % (ref 0–1.5)
BILIRUB SERPL-MCNC: 1.1 MG/DL (ref 0.1–1.2)
BUN SERPL-MCNC: 14 MG/DL (ref 8–23)
BUN/CREAT SERPL: 15.9 (ref 7–25)
CALCIUM SERPL-MCNC: 9.8 MG/DL (ref 8.6–10.5)
CHLORIDE SERPL-SCNC: 100 MMOL/L (ref 98–107)
CO2 SERPL-SCNC: 27 MMOL/L (ref 22–29)
CREAT SERPL-MCNC: 0.88 MG/DL (ref 0.57–1)
EOSINOPHIL # BLD AUTO: 0.2 10*3/MM3 (ref 0–0.7)
EOSINOPHIL NFR BLD AUTO: 2.9 % (ref 0.3–6.2)
ERYTHROCYTE [DISTWIDTH] IN BLOOD BY AUTOMATED COUNT: 13.5 % (ref 11.7–13)
GFR SERPLBLD CREATININE-BSD FMLA CKD-EPI: 65 ML/MIN/1.73
GFR SERPLBLD CREATININE-BSD FMLA CKD-EPI: 79 ML/MIN/1.73
GLOBULIN SER CALC-MCNC: 2.8 GM/DL
GLUCOSE SERPL-MCNC: 102 MG/DL (ref 65–99)
HCT VFR BLD AUTO: 42.9 % (ref 35.6–45.5)
HGB BLD-MCNC: 14.5 G/DL (ref 11.9–15.5)
IMM GRANULOCYTES # BLD: 0 10*3/MM3 (ref 0–0.03)
IMM GRANULOCYTES NFR BLD: 0 % (ref 0–0.5)
LYMPHOCYTES # BLD AUTO: 2.52 10*3/MM3 (ref 0.9–4.8)
LYMPHOCYTES NFR BLD AUTO: 36.5 % (ref 19.6–45.3)
MCH RBC QN AUTO: 29.7 PG (ref 26.9–32)
MCHC RBC AUTO-ENTMCNC: 33.8 G/DL (ref 32.4–36.3)
MCV RBC AUTO: 87.9 FL (ref 80.5–98.2)
MONOCYTES # BLD AUTO: 0.41 10*3/MM3 (ref 0.2–1.2)
MONOCYTES NFR BLD AUTO: 5.9 % (ref 5–12)
NEUTROPHILS # BLD AUTO: 3.73 10*3/MM3 (ref 1.9–8.1)
NEUTROPHILS NFR BLD AUTO: 54.1 % (ref 42.7–76)
PLATELET # BLD AUTO: 211 10*3/MM3 (ref 140–500)
POTASSIUM SERPL-SCNC: 4.2 MMOL/L (ref 3.5–5.2)
PROT SERPL-MCNC: 7.4 G/DL (ref 6–8.5)
RBC # BLD AUTO: 4.88 10*6/MM3 (ref 3.9–5.2)
SODIUM SERPL-SCNC: 139 MMOL/L (ref 136–145)
WBC # BLD AUTO: 6.9 10*3/MM3 (ref 4.5–10.7)

## 2018-03-20 PROCEDURE — 99214 OFFICE O/P EST MOD 30 MIN: CPT | Performed by: NURSE PRACTITIONER

## 2018-03-20 NOTE — TELEPHONE ENCOUNTER
----- Message from LOVE Hernandez sent at 3/20/2018  3:30 PM EDT -----  Please make sure patient has right prescription of HUMIRA pens sent to her pharmacy. Thanks.

## 2018-03-20 NOTE — TELEPHONE ENCOUNTER
See Humira PA of 1/30/18 - tx initiated and 6 refills.  [facility] then [facility].

## 2018-03-20 NOTE — TELEPHONE ENCOUNTER
I dont know where the mix up happened but the patient was given HUMIRA needles and syringes instead of the pens. She needs the pens. Thanks.

## 2018-03-20 NOTE — PROGRESS NOTES
Chief Complaint   Patient presents with   • Follow-up     for humira       Daisy Escobar is a  62 y.o. female here for a follow up visit after starting Humira for Crohn's disease.     HPI  61 yo f presents today follow up after starting humira starter pack for Crohn's disease. She is a patient of Dr. Patel. She was on REMICADE in the past for the Crohn's and admits it never seem to help her symptoms. She admits since being on the Humira she is already noticing a positive difference in her symptoms. She denies any GI issues today. She admits her appetite is good and her weight is stable. She denies any reflux, dysphagia, abd pain, constipation, rectal bleeding or melena.   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     PRN Meds:.    Allergies   Allergen Reactions   • Azathioprine        Social History     Social History   • Marital status:      Spouse name: N/A   • Number of children: N/A   • Years of education: N/A     Occupational History   • Not on file.     Social History Main Topics   • Smoking status: Former Smoker     Packs/day: 0.25     Types: Cigarettes     Quit date: 1975   • Smokeless tobacco: Never Used   • Alcohol use No   • Drug use: No   • Sexual activity: Defer     Other Topics Concern   • Not on file     Social History Narrative   • No narrative on file       History reviewed. No pertinent family history.    Review of Systems   Constitutional: Negative for appetite change, chills, diaphoresis, fatigue, fever and unexpected weight change.    HENT: Negative for nosebleeds, postnasal drip, sore throat, trouble swallowing and voice change.    Respiratory: Negative for cough, choking, chest tightness, shortness of breath and wheezing.    Cardiovascular: Negative for chest pain.   Gastrointestinal: Negative for abdominal distention, abdominal pain, anal bleeding, blood in stool, constipation, diarrhea, nausea, rectal pain and vomiting.   Endocrine: Negative for polydipsia, polyphagia and polyuria.   Musculoskeletal: Negative for gait problem.   Skin: Negative for rash and wound.   Allergic/Immunologic: Negative for food allergies.   Neurological: Negative for dizziness, speech difficulty and light-headedness.   Psychiatric/Behavioral: Negative for confusion, self-injury, sleep disturbance and suicidal ideas.       Vitals:    03/20/18 1507   BP: 120/82   Temp: 98.3 °F (36.8 °C)       Physical Exam   Constitutional: She is oriented to person, place, and time. She appears well-developed and well-nourished. She does not appear ill. No distress.   HENT:   Head: Normocephalic.   Eyes: Pupils are equal, round, and reactive to light.   Cardiovascular: Normal rate, regular rhythm and normal heart sounds.    Pulmonary/Chest: Effort normal and breath sounds normal.   Abdominal: Soft. Bowel sounds are normal. She exhibits no distension and no mass. There is no hepatosplenomegaly. There is no tenderness. There is no rebound and no guarding. No hernia.   Musculoskeletal: Normal range of motion.   Neurological: She is alert and oriented to person, place, and time.   Skin: Skin is warm and dry.   Psychiatric: She has a normal mood and affect. Her speech is normal and behavior is normal. Judgment normal.       No images are attached to the encounter.    Assessment & Plan      1. Crohn's disease of both small and large intestine without complication  - CBC & Differential  - Comprehensive Metabolic Panel    2. Adalimumab (Humira) long-term use  - CBC & Differential  -  Comprehensive Metabolic Panel    Patient seems to be doing well on the HUMIRA. I will check labs today and have patient follow up with Dr. Patel in 3 months.

## 2018-03-21 ENCOUNTER — TELEPHONE (OUTPATIENT)
Dept: GASTROENTEROLOGY | Facility: CLINIC | Age: 63
End: 2018-03-21

## 2018-03-21 NOTE — TELEPHONE ENCOUNTER
Call to [facility] @ [phone] -  left with request for Reginald Canela.  Awaiting reply.

## 2018-03-21 NOTE — TELEPHONE ENCOUNTER
Humira 40mg inject one pen under skin every 14 days #2 with 11 refills was escribed to [facility] .     Called pt and advised that we have sent her humira script for the pens to [facility]. Pt verb understanding.

## 2018-04-06 ENCOUNTER — TELEPHONE (OUTPATIENT)
Dept: GASTROENTEROLOGY | Facility: CLINIC | Age: 63
End: 2018-04-06

## 2018-04-06 NOTE — TELEPHONE ENCOUNTER
----- Message from Jan Luis sent at 4/6/2018  1:23 PM EDT -----  Regarding: pt called with questions   Contact: [phone]  Pt has a head cold & is suppose to have a REMICADE soon. Will that affect it

## 2018-04-06 NOTE — TELEPHONE ENCOUNTER
Call to pt.  States has head cold.  Denies fever.  States is not on any rx except Ibuprofen for aches.  Due to Humira tomorrow - asking if ok to take.    Confer with LOVE Healy.  IF pt does not have fever, and not on abx - ok to take Humira as scheduled.  IF develops fever, or goes on abx - defer Humira and contact this office on 4/9.    Advise pt of above - verb understanding.

## 2018-05-15 ENCOUNTER — APPOINTMENT (OUTPATIENT)
Dept: WOMENS IMAGING | Facility: HOSPITAL | Age: 63
End: 2018-05-15

## 2018-05-15 PROCEDURE — 77067 SCR MAMMO BI INCL CAD: CPT | Performed by: RADIOLOGY

## 2018-05-15 PROCEDURE — 77063 BREAST TOMOSYNTHESIS BI: CPT | Performed by: RADIOLOGY

## 2018-06-28 ENCOUNTER — OFFICE VISIT (OUTPATIENT)
Dept: GASTROENTEROLOGY | Facility: CLINIC | Age: 63
End: 2018-06-28

## 2018-06-28 VITALS
DIASTOLIC BLOOD PRESSURE: 84 MMHG | TEMPERATURE: 98 F | WEIGHT: 161.4 LBS | SYSTOLIC BLOOD PRESSURE: 134 MMHG | HEIGHT: 62 IN | BODY MASS INDEX: 29.7 KG/M2

## 2018-06-28 DIAGNOSIS — K50.00 CROHN'S DISEASE OF SMALL INTESTINE WITHOUT COMPLICATION (HCC): Primary | ICD-10-CM

## 2018-06-28 DIAGNOSIS — E53.8 VITAMIN B12 DEFICIENCY: ICD-10-CM

## 2018-06-28 DIAGNOSIS — E55.9 VITAMIN D DEFICIENCY DISEASE: ICD-10-CM

## 2018-06-28 DIAGNOSIS — Z79.620 ADALIMUMAB (HUMIRA) LONG-TERM USE: ICD-10-CM

## 2018-06-28 PROCEDURE — 99214 OFFICE O/P EST MOD 30 MIN: CPT | Performed by: INTERNAL MEDICINE

## 2018-06-28 RX ORDER — CLOBETASOL PROPIONATE 0.5 MG/G
AEROSOL, FOAM TOPICAL
COMMUNITY
Start: 2018-05-04 | End: 2019-06-17

## 2018-06-28 NOTE — PROGRESS NOTES
Chief Complaint   Patient presents with   • Follow-up   • Crohn's Disease       Daisy Escobar is a  63 y.o. female here for a follow up visit for . Switched to humira because her job prohibited her from getting the infusions.  She took remicade for over a year and continued to have diarrhea and joint pain.  Last sbft 9/17, last c/s 8/16.  Due for annual hep B/tb 9/18.  Occasional low abdominal pain.  No blood in stool.  Has about 4 BMs daily.  Eats what she wants for the most part -certain things get her in trouble such as cooked kale.  Joint pain has resolved.  Overall she feels better on humira than remicade - no eye pain or redness.  Due for derm check.    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, severe stenosis, a single erosion at colonic anastmosis, erythematous mucosa in colon, NBIH, patchy edema   • OVARIAN CYST SURGERY         Current Outpatient Prescriptions:   •  Adalimumab (HUMIRA PEN) 40 MG/0.8ML Pen-injector Kit, Inject 40 mg under the skin Every 14 (Fourteen) Days., Disp: 2 each, Rfl: 11  •  Calcium Carbonate (CALCIUM 600 PO), Take  by mouth Daily., Disp: , Rfl:   •  clobetasol (OLUX) 0.05 % topical foam, , Disp: , Rfl:   •  hyoscyamine (LEVSIN) 0.125 MG SL tablet, Take 1 tablet by mouth Every 4 (Four) Hours As Needed for Cramping., Disp: 60 tablet, Rfl: 3  •  Multiple Vitamin (MULTI VITAMIN DAILY PO), Take  by mouth Daily., Disp: , Rfl:   PRN Meds:.  Allergies   Allergen Reactions   • Azathioprine      Social History     Social History   • Marital status:      Spouse name: N/A   • Number of children: N/A   • Years of education: N/A     Occupational History   • Not on file.     Social History Main Topics   • Smoking status: Former Smoker     Packs/day: 0.25     Types:  Cigarettes     Quit date: 1975   • Smokeless tobacco: Never Used   • Alcohol use No   • Drug use: No   • Sexual activity: Defer     Other Topics Concern   • Not on file     Social History Narrative   • No narrative on file     History reviewed. No pertinent family history.  Review of Systems   Constitutional: Negative for appetite change and unexpected weight change.   Gastrointestinal: Positive for diarrhea. Negative for abdominal pain and blood in stool.   All other systems reviewed and are negative.    Vitals:    06/28/18 0829   BP: 134/84   Temp: 98 °F (36.7 °C)     1    06/28/18  0829   Weight: 73.2 kg (161 lb 6.4 oz)     Physical Exam   Constitutional: She is oriented to person, place, and time. She appears well-developed and well-nourished.   HENT:   Head: Normocephalic and atraumatic.   Eyes: Conjunctivae are normal. No scleral icterus.   Neck: Normal range of motion. Neck supple.   Cardiovascular: Normal rate and regular rhythm.    Pulmonary/Chest: Effort normal and breath sounds normal.   Abdominal: Soft. Bowel sounds are normal. She exhibits no distension. There is no tenderness.   Musculoskeletal: She exhibits no edema.   Neurological: She is alert and oriented to person, place, and time.   Skin: Skin is warm and dry.   Psychiatric: She has a normal mood and affect.   Nursing note and vitals reviewed.    No images are attached to the encounter.  Diagnoses and all orders for this visit:    Crohn's disease of small intestine without complication  -     FL Upper GI Single Contrast SBFT; Future    Adalimumab (Humira) long-term use    Vitamin D deficiency disease    Vitamin B12 deficiency    Other orders  -     clobetasol (OLUX) 0.05 % topical foam;     PLan:  - recommend derm annual eval  - due for labs in Sept - TB quantiferon, hepatitis B surface antibody, B12, Vitamin D, cbc, cmp  - will schedule sbft this fall for 1 year f/u of ileal stenosis  - continue Humira

## 2018-09-04 ENCOUNTER — HOSPITAL ENCOUNTER (OUTPATIENT)
Dept: GENERAL RADIOLOGY | Facility: HOSPITAL | Age: 63
Discharge: HOME | End: 2018-09-04
Attending: INTERNAL MEDICINE | Admitting: INTERNAL MEDICINE

## 2018-09-04 DIAGNOSIS — K50.00 CROHN'S DISEASE OF SMALL INTESTINE WITHOUT COMPLICATION (HCC): ICD-10-CM

## 2018-09-04 PROCEDURE — 74250 X-RAY XM SM INT 1CNTRST STD: CPT

## 2018-09-04 RX ADMIN — BARIUM SULFATE 366 ML: 960 POWDER, FOR SUSPENSION ORAL at 09:40

## 2018-09-05 ENCOUNTER — TELEPHONE (OUTPATIENT)
Dept: GASTROENTEROLOGY | Facility: CLINIC | Age: 63
End: 2018-09-05

## 2018-09-05 DIAGNOSIS — K50.919 CROHN'S DISEASE WITH COMPLICATION, UNSPECIFIED GASTROINTESTINAL TRACT LOCATION (HCC): Primary | ICD-10-CM

## 2018-09-05 NOTE — TELEPHONE ENCOUNTER
Stable appearing narrowing in the distal small intestine-continue current medications.  Follow-up as scheduled    She needs updated labs-CBC, CMP

## 2018-09-28 LAB
ALBUMIN SERPL-MCNC: 4.4 G/DL (ref 3.5–5.2)
ALBUMIN/GLOB SERPL: 1.5 G/DL
ALP SERPL-CCNC: 58 U/L (ref 39–117)
ALT SERPL-CCNC: 23 U/L (ref 1–33)
AST SERPL-CCNC: 18 U/L (ref 1–32)
BASOPHILS # BLD AUTO: 0.06 10*3/MM3 (ref 0–0.2)
BASOPHILS NFR BLD AUTO: 1 % (ref 0–1.5)
BILIRUB SERPL-MCNC: 1.4 MG/DL (ref 0.1–1.2)
BUN SERPL-MCNC: 9 MG/DL (ref 8–23)
BUN/CREAT SERPL: 11.3 (ref 7–25)
CALCIUM SERPL-MCNC: 9.5 MG/DL (ref 8.6–10.5)
CHLORIDE SERPL-SCNC: 103 MMOL/L (ref 98–107)
CO2 SERPL-SCNC: 26.6 MMOL/L (ref 22–29)
CREAT SERPL-MCNC: 0.8 MG/DL (ref 0.57–1)
EOSINOPHIL # BLD AUTO: 0.13 10*3/MM3 (ref 0–0.7)
EOSINOPHIL NFR BLD AUTO: 2.3 % (ref 0.3–6.2)
ERYTHROCYTE [DISTWIDTH] IN BLOOD BY AUTOMATED COUNT: 13.3 % (ref 11.7–13)
GLOBULIN SER CALC-MCNC: 3 GM/DL
GLUCOSE SERPL-MCNC: 93 MG/DL (ref 65–99)
HCT VFR BLD AUTO: 44.6 % (ref 35.6–45.5)
HGB BLD-MCNC: 14.5 G/DL (ref 11.9–15.5)
IMM GRANULOCYTES # BLD: 0 10*3/MM3 (ref 0–0.03)
IMM GRANULOCYTES NFR BLD: 0 % (ref 0–0.5)
LYMPHOCYTES # BLD AUTO: 2.09 10*3/MM3 (ref 0.9–4.8)
LYMPHOCYTES NFR BLD AUTO: 36.4 % (ref 19.6–45.3)
MCH RBC QN AUTO: 28.7 PG (ref 26.9–32)
MCHC RBC AUTO-ENTMCNC: 32.5 G/DL (ref 32.4–36.3)
MCV RBC AUTO: 88.3 FL (ref 80.5–98.2)
MONOCYTES # BLD AUTO: 0.46 10*3/MM3 (ref 0.2–1.2)
MONOCYTES NFR BLD AUTO: 8 % (ref 5–12)
NEUTROPHILS # BLD AUTO: 3 10*3/MM3 (ref 1.9–8.1)
NEUTROPHILS NFR BLD AUTO: 52.3 % (ref 42.7–76)
PLATELET # BLD AUTO: 237 10*3/MM3 (ref 140–500)
POTASSIUM SERPL-SCNC: 3.9 MMOL/L (ref 3.5–5.2)
PROT SERPL-MCNC: 7.4 G/DL (ref 6–8.5)
RBC # BLD AUTO: 5.05 10*6/MM3 (ref 3.9–5.2)
SODIUM SERPL-SCNC: 143 MMOL/L (ref 136–145)
WBC # BLD AUTO: 5.74 10*3/MM3 (ref 4.5–10.7)

## 2018-10-02 ENCOUNTER — TELEPHONE (OUTPATIENT)
Dept: GASTROENTEROLOGY | Facility: CLINIC | Age: 63
End: 2018-10-02

## 2018-10-02 DIAGNOSIS — R79.89 ELEVATED LFTS: Primary | ICD-10-CM

## 2018-10-02 NOTE — TELEPHONE ENCOUNTER
Her labs look good-minimal elevation of her total bilirubin.  Would like to repeat a CMP in 1 month as a result.  Office follow-up in December as scheduled

## 2018-10-10 NOTE — TELEPHONE ENCOUNTER
Call to pt.  Advise per Dr Patel that labs look good - minimal elevation of total bili.  Would like to repeat cmp in 1 mo as a results.  Office f/u in Dec as scheduled.    Pt verb understanding.  Lab appt scheduled for 11/5 @ 9am.  Order placed for cmp - message to DR Patel.

## 2018-10-29 ENCOUNTER — RESULTS ENCOUNTER (OUTPATIENT)
Dept: GASTROENTEROLOGY | Facility: CLINIC | Age: 63
End: 2018-10-29

## 2018-10-29 DIAGNOSIS — R79.89 ELEVATED LFTS: ICD-10-CM

## 2018-11-05 LAB
ALBUMIN SERPL-MCNC: 4.1 G/DL (ref 3.5–5.2)
ALBUMIN/GLOB SERPL: 1.4 G/DL
ALP SERPL-CCNC: 55 U/L (ref 39–117)
ALT SERPL-CCNC: 24 U/L (ref 1–33)
AST SERPL-CCNC: 17 U/L (ref 1–32)
BILIRUB SERPL-MCNC: 1.1 MG/DL (ref 0.1–1.2)
BUN SERPL-MCNC: 8 MG/DL (ref 8–23)
BUN/CREAT SERPL: 11.6 (ref 7–25)
CALCIUM SERPL-MCNC: 9.2 MG/DL (ref 8.6–10.5)
CHLORIDE SERPL-SCNC: 104 MMOL/L (ref 98–107)
CO2 SERPL-SCNC: 25.4 MMOL/L (ref 22–29)
CREAT SERPL-MCNC: 0.69 MG/DL (ref 0.57–1)
GLOBULIN SER CALC-MCNC: 3 GM/DL
GLUCOSE SERPL-MCNC: 101 MG/DL (ref 65–99)
POTASSIUM SERPL-SCNC: 3.6 MMOL/L (ref 3.5–5.2)
PROT SERPL-MCNC: 7.1 G/DL (ref 6–8.5)
SODIUM SERPL-SCNC: 142 MMOL/L (ref 136–145)

## 2018-11-06 ENCOUNTER — TELEPHONE (OUTPATIENT)
Dept: GASTROENTEROLOGY | Facility: CLINIC | Age: 63
End: 2018-11-06

## 2018-11-06 NOTE — TELEPHONE ENCOUNTER
----- Message from Patricia Patel MD sent at 11/5/2018  5:37 PM EST -----  Liver function tests are normal

## 2018-11-06 NOTE — TELEPHONE ENCOUNTER
Called pt and advised per Dr Patel that her liver function tests are normal. Pt verb understanding.

## 2018-12-31 ENCOUNTER — OFFICE VISIT (OUTPATIENT)
Dept: GASTROENTEROLOGY | Facility: CLINIC | Age: 63
End: 2018-12-31

## 2018-12-31 VITALS
DIASTOLIC BLOOD PRESSURE: 92 MMHG | SYSTOLIC BLOOD PRESSURE: 128 MMHG | HEIGHT: 62 IN | WEIGHT: 159.6 LBS | TEMPERATURE: 98.2 F | BODY MASS INDEX: 29.37 KG/M2

## 2018-12-31 DIAGNOSIS — K50.00 CROHN'S DISEASE OF SMALL INTESTINE WITHOUT COMPLICATION (HCC): Primary | ICD-10-CM

## 2018-12-31 DIAGNOSIS — Z79.620 ADALIMUMAB (HUMIRA) LONG-TERM USE: ICD-10-CM

## 2018-12-31 PROCEDURE — 99213 OFFICE O/P EST LOW 20 MIN: CPT | Performed by: INTERNAL MEDICINE

## 2018-12-31 NOTE — PROGRESS NOTES
Chief Complaint   Patient presents with   • Crohn's Disease       Daisy Escobar is a  63 y.o. female here for a follow up visit for crohn's disease involving the TI.     Switched to humira because her job prohibited her from getting the infusions.  She took remicade for over a year and continued to have diarrhea and joint pain.  Last sbft 9/17, last c/s 8/16.  Due for annual hep B/tb.  Occasional low abdominal pain.  No blood in stool.  Has about 4 BMs daily.  Occasional abdominal pain.  Overall she feels better on humira than remicade - no eye pain or redness.  Due for derm check.  Has not rcvd flu vaccine.    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, severe stenosis, a single erosion at colonic anastmosis, erythematous mucosa in colon, NBIH, patchy edema   • OVARIAN CYST SURGERY         Current Outpatient Medications:   •  Calcium Carbonate (CALCIUM 600 PO), Take  by mouth Daily., Disp: , Rfl:   •  Adalimumab (HUMIRA) 40 MG/0.4ML Pen-injector Kit, Inject  under the skin into the appropriate area as directed Every 14 (Fourteen) Days., Disp: 6 each, Rfl: 3  •  clobetasol (OLUX) 0.05 % topical foam, , Disp: , Rfl:   •  hyoscyamine (LEVSIN) 0.125 MG SL tablet, Take 1 tablet by mouth Every 4 (Four) Hours As Needed for Cramping., Disp: 60 tablet, Rfl: 3  •  Multiple Vitamin (MULTI VITAMIN DAILY PO), Take  by mouth Daily., Disp: , Rfl:   PRN Meds:.  Allergies   Allergen Reactions   • Azathioprine GI Intolerance     Social History     Socioeconomic History   • Marital status:      Spouse name: Not on file   • Number of children: Not on file   • Years of education: Not on file   • Highest education level: Not on file   Social Needs   • Financial resource strain: Not on file   • Food  insecurity - worry: Not on file   • Food insecurity - inability: Not on file   • Transportation needs - medical: Not on file   • Transportation needs - non-medical: Not on file   Occupational History   • Not on file   Tobacco Use   • Smoking status: Former Smoker     Packs/day: 0.25     Types: Cigarettes     Last attempt to quit: 1975     Years since quittin.0   • Smokeless tobacco: Never Used   Substance and Sexual Activity   • Alcohol use: No   • Drug use: No   • Sexual activity: Defer   Other Topics Concern   • Not on file   Social History Narrative   • Not on file     History reviewed. No pertinent family history.  Review of Systems   Constitutional: Negative for appetite change, fever and unexpected weight change.   Eyes: Negative.    Gastrointestinal: Negative for abdominal pain and blood in stool.   Musculoskeletal: Positive for arthralgias.   Skin: Negative.    All other systems reviewed and are negative.    Vitals:    18   BP: 128/92   Temp: 98.2 °F (36.8 °C)         18   Weight: 72.4 kg (159 lb 9.6 oz)     Physical Exam   Constitutional: She appears well-developed and well-nourished.   HENT:   Head: Normocephalic and atraumatic.   Eyes: No scleral icterus.   Abdominal: Soft. She exhibits no distension and no mass. There is no tenderness.   Neurological: She is alert.   Skin: Skin is warm and dry.   Psychiatric: She has a normal mood and affect.     No images are attached to the encounter.  Diagnoses and all orders for this visit:    Crohn's disease of small intestine without complication (CMS/HCC)  -     QuantiFERON TB Gold  -     Vitamin B12  -     Vitamin D 25 Hydroxy  -     Hepatitis B Surface Antigen  -     CBC & Differential    Adalimumab (Humira) long-term use    Other orders  -     Adalimumab (HUMIRA) 40 MG/0.4ML Pen-injector Kit; Inject  under the skin into the appropriate area as directed Every 14 (Fourteen) Days.    Plan:  - continue humira q 2 weeks - change to  citrate free version  - needs flu vaccine  - due for derm check  - labs today  - office f/u in 6 months

## 2019-01-01 LAB
25(OH)D3+25(OH)D2 SERPL-MCNC: 34.8 NG/ML (ref 30–100)
BASOPHILS # BLD AUTO: 0.1 X10E3/UL (ref 0–0.2)
BASOPHILS NFR BLD AUTO: 1 %
EOSINOPHIL # BLD AUTO: 0.1 X10E3/UL (ref 0–0.4)
EOSINOPHIL NFR BLD AUTO: 2 %
ERYTHROCYTE [DISTWIDTH] IN BLOOD BY AUTOMATED COUNT: 14.3 % (ref 12.3–15.4)
HBV SURFACE AG SERPL QL IA: NEGATIVE
HCT VFR BLD AUTO: 42.9 % (ref 34–46.6)
HGB BLD-MCNC: 14.1 G/DL (ref 11.1–15.9)
IMM GRANULOCYTES # BLD: 0 X10E3/UL (ref 0–0.1)
IMM GRANULOCYTES NFR BLD: 0 %
LYMPHOCYTES # BLD AUTO: 1.8 X10E3/UL (ref 0.7–3.1)
LYMPHOCYTES NFR BLD AUTO: 33 %
MCH RBC QN AUTO: 28.7 PG (ref 26.6–33)
MCHC RBC AUTO-ENTMCNC: 32.9 G/DL (ref 31.5–35.7)
MCV RBC AUTO: 87 FL (ref 79–97)
MONOCYTES # BLD AUTO: 0.4 X10E3/UL (ref 0.1–0.9)
MONOCYTES NFR BLD AUTO: 7 %
NEUTROPHILS # BLD AUTO: 3.1 X10E3/UL (ref 1.4–7)
NEUTROPHILS NFR BLD AUTO: 57 %
PLATELET # BLD AUTO: 247 X10E3/UL (ref 150–379)
RBC # BLD AUTO: 4.92 X10E6/UL (ref 3.77–5.28)
VIT B12 SERPL-MCNC: 709 PG/ML (ref 232–1245)
WBC # BLD AUTO: 5.4 X10E3/UL (ref 3.4–10.8)

## 2019-01-02 ENCOUNTER — TELEPHONE (OUTPATIENT)
Dept: GASTROENTEROLOGY | Facility: CLINIC | Age: 64
End: 2019-01-02

## 2019-01-02 NOTE — TELEPHONE ENCOUNTER
----- Message from Patricia Patel MD sent at 1/2/2019  9:29 AM EST -----  Regarding: pls check status of tb quantiferon      ----- Message -----  From: Interface, Reflab Results In  Sent: 1/1/2019   7:09 AM  To: Patricia Patel MD

## 2019-01-03 LAB
GAMMA INTERFERON BACKGROUND BLD IA-ACNC: 0.02 IU/ML
M TB IFN-G BLD-IMP: NEGATIVE
M TB IFN-G CD4+ BCKGRND COR BLD-ACNC: 0.02 IU/ML
MITOGEN IGNF BLD-ACNC: 9.21 IU/ML
QUANTIFERON INCUBATION: NORMAL
QUANTIFERON TB2 AG VALUE: 0.02 IU/ML
SERVICE CMNT-IMP: NORMAL

## 2019-01-07 ENCOUNTER — TELEPHONE (OUTPATIENT)
Dept: GASTROENTEROLOGY | Facility: CLINIC | Age: 64
End: 2019-01-07

## 2019-01-07 NOTE — TELEPHONE ENCOUNTER
Called pt and on identifed vm advised per Dr Patel that her recent lab work was normal and to call with any questions.

## 2019-01-07 NOTE — TELEPHONE ENCOUNTER
----- Message from Patricia Patel MD sent at 1/4/2019  1:55 PM EST -----  Your recent labwork was normal.

## 2019-01-23 ENCOUNTER — PRIOR AUTHORIZATION (OUTPATIENT)
Dept: GASTROENTEROLOGY | Facility: CLINIC | Age: 64
End: 2019-01-23

## 2019-01-24 NOTE — TELEPHONE ENCOUNTER
Per Marisol at [facility] patient does not want to get Humira through them. I spoke with patient to clarify and was told to contact [facility] who she has been getting her Humira through. I spoke with Myla at [employer] and Sadie at [employer] to request PA form to be sent here.     PA and additional info faxed to [employer].

## 2019-06-17 ENCOUNTER — TELEPHONE (OUTPATIENT)
Dept: GASTROENTEROLOGY | Facility: CLINIC | Age: 64
End: 2019-06-17

## 2019-06-17 ENCOUNTER — OFFICE VISIT (OUTPATIENT)
Dept: GASTROENTEROLOGY | Facility: CLINIC | Age: 64
End: 2019-06-17

## 2019-06-17 VITALS
TEMPERATURE: 98.7 F | BODY MASS INDEX: 30.07 KG/M2 | SYSTOLIC BLOOD PRESSURE: 134 MMHG | WEIGHT: 163.4 LBS | HEIGHT: 62 IN | DIASTOLIC BLOOD PRESSURE: 80 MMHG

## 2019-06-17 DIAGNOSIS — K50.00 CROHN'S DISEASE OF SMALL INTESTINE WITHOUT COMPLICATION (HCC): Primary | ICD-10-CM

## 2019-06-17 DIAGNOSIS — E87.6 HYPOKALEMIA: ICD-10-CM

## 2019-06-17 DIAGNOSIS — Z79.620 ADALIMUMAB (HUMIRA) LONG-TERM USE: ICD-10-CM

## 2019-06-17 LAB
BUN SERPL-MCNC: 8 MG/DL (ref 8–23)
BUN/CREAT SERPL: 11.1 (ref 7–25)
CALCIUM SERPL-MCNC: 9.4 MG/DL (ref 8.6–10.5)
CHLORIDE SERPL-SCNC: 103 MMOL/L (ref 98–107)
CO2 SERPL-SCNC: 25.9 MMOL/L (ref 22–29)
CREAT SERPL-MCNC: 0.72 MG/DL (ref 0.57–1)
GLUCOSE SERPL-MCNC: 93 MG/DL (ref 65–99)
POTASSIUM SERPL-SCNC: 4 MMOL/L (ref 3.5–5.2)
SODIUM SERPL-SCNC: 141 MMOL/L (ref 136–145)

## 2019-06-17 PROCEDURE — 99213 OFFICE O/P EST LOW 20 MIN: CPT | Performed by: INTERNAL MEDICINE

## 2019-06-17 NOTE — TELEPHONE ENCOUNTER
I am working with Joselo Coleman on this.   I contacted pt's pharm for part D info. BIN [id number], PCN [id number], GRP [id number].    Waiting for further instructions

## 2019-06-17 NOTE — PROGRESS NOTES
Chief Complaint   Patient presents with   • Crohn's Disease       Daisy Escobar is a  64 y.o. female here for a follow up visit for crohn's diseas involving the TI.     Switched to humira because her job prohibited her from getting the infusions.  She took remicade for over a year and continued to have diarrhea and joint pain.  Last sbft 9/17, last c/s 8/16.  Hep B/TB negative 12/18.  Occasional low abdominal pain.  No blood in stool.  Has about 4 BMs daily.  Occasional abdominal pain.  Overall she feels better on humira than remicade - no eye pain or redness. Typically has 3 BMs in the morning.  Rare lower abdominal pain.  No extraintestinal manifestations.   Denies any f/c/infections.    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  Allergies   Allergen Reactions   • Azathioprine GI Intolerance   • Budesonide GI Intolerance     Social History     Socioeconomic History   • Marital status:      Spouse name: Not on file   • Number of children: Not on file   • Years of education: Not on file   • Highest education level: Not on file    Tobacco Use   • Smoking status: Former Smoker     Packs/day: 0.25     Types: Cigarettes     Last attempt to quit: 1975     Years since quittin.4   • Smokeless tobacco: Never Used   Substance and Sexual Activity   • Alcohol use: No   • Drug use: No   • Sexual activity: Defer     History reviewed. No pertinent family history.  Review of Systems   Constitutional: Negative for appetite change, fever and unexpected weight change.   Eyes: Negative.    Gastrointestinal: Negative for abdominal pain, blood in stool and diarrhea.   Musculoskeletal: Negative.    Skin: Negative.    All other systems reviewed and are negative.    Vitals:    19 0855   BP: 134/80   Temp: 98.7 °F (37.1 °C)         19  0855   Weight: 74.1 kg (163 lb 6.4 oz)     Physical Exam   Constitutional: She appears well-developed and well-nourished.   HENT:   Head: Normocephalic and atraumatic.   Eyes: No scleral icterus.   Pulmonary/Chest: Effort normal.   Abdominal: Soft. She exhibits no distension. There is no tenderness.   Neurological: She is alert.   Skin: Skin is warm and dry.   Psychiatric: She has a normal mood and affect.     No images are attached to the encounter.  Diagnoses and all orders for this visit:    Crohn's disease of small intestine without complication (CMS/HCC)  -     CBC & Differential    Adalimumab (Humira) long-term use    Hypokalemia  -     Basic Metabolic Panel    Plan:  - continue humira - she has applied for medicare and therefore will need to reevaluate the best way to get this for her  - recheck labs today - may need K supplement

## 2019-06-17 NOTE — TELEPHONE ENCOUNTER
Pt seen in office today and she is about to go on Medicare.  Pt unsure if  she can afford Humira after going on Medicare.  Message given to Kim . .

## 2019-06-18 NOTE — TELEPHONE ENCOUNTER
Spoke with Karolina at [employer] and she inquired about benefit verification. The est quote she gave me: $415.00 deductible - est Humira cost $1,600.00 for the first month, $1,000.00 for the second month, and $260.00 every month after. Another option is to apply for patient assistance since they qualify based on income.

## 2019-06-19 ENCOUNTER — TELEPHONE (OUTPATIENT)
Dept: GASTROENTEROLOGY | Facility: CLINIC | Age: 64
End: 2019-06-19

## 2019-06-19 NOTE — TELEPHONE ENCOUNTER
Forms completed and called pt advised we will mail them to her and she can complete her section and then fax the papers to  [employer].  Pt verb understanding.  Pt is also going to check and see if her supplement ins will cover it.

## 2019-06-19 NOTE — TELEPHONE ENCOUNTER
----- Message from Patricia Patel MD sent at 6/18/2019 12:38 PM EDT -----  Potassium level normal - no supplementation needed

## 2019-06-19 NOTE — TELEPHONE ENCOUNTER
Called pt and advised per Dr Patel that her potassium level is normal and no supplementation is needed. Pt verb understanding.

## 2019-06-19 NOTE — TELEPHONE ENCOUNTER
Pt assistant form printed from [employer] website.  Kim to fill out our section and then give pt the form so she can fill out the financial section.

## 2019-10-21 ENCOUNTER — TELEPHONE (OUTPATIENT)
Dept: GASTROENTEROLOGY | Facility: CLINIC | Age: 64
End: 2019-10-21

## 2019-10-21 NOTE — TELEPHONE ENCOUNTER
----- Message from Annel Cheng sent at 10/21/2019  9:30 AM EDT -----  Regarding: [employer]  Contact: [phone]  REQUESTING CALL FROM LACEY

## 2019-11-05 ENCOUNTER — TELEPHONE (OUTPATIENT)
Dept: GASTROENTEROLOGY | Facility: CLINIC | Age: 64
End: 2019-11-05

## 2019-11-05 NOTE — TELEPHONE ENCOUNTER
Call to pt.  States will be receiving Humira from [employer], but first supply won't ship until 11/7.  Was due for injection on 11/4.    Advise pt that sample pen reserved for her.  Verb understanding.

## 2019-11-05 NOTE — TELEPHONE ENCOUNTER
----- Message from Astrid Sauceda Rep sent at 11/5/2019  8:56 AM EST -----  Regarding: meds  Contact: [phone]  Pt said she would like to talk to nurse about her meds.

## 2019-12-03 ENCOUNTER — OFFICE VISIT (OUTPATIENT)
Dept: GASTROENTEROLOGY | Facility: CLINIC | Age: 64
End: 2019-12-03

## 2019-12-03 VITALS
SYSTOLIC BLOOD PRESSURE: 124 MMHG | BODY MASS INDEX: 29.48 KG/M2 | HEIGHT: 62 IN | DIASTOLIC BLOOD PRESSURE: 82 MMHG | TEMPERATURE: 97.4 F | WEIGHT: 160.2 LBS

## 2019-12-03 DIAGNOSIS — K50.00 CROHN'S DISEASE OF SMALL INTESTINE WITHOUT COMPLICATION (HCC): Primary | ICD-10-CM

## 2019-12-03 DIAGNOSIS — Z79.620 ADALIMUMAB (HUMIRA) LONG-TERM USE: ICD-10-CM

## 2019-12-03 PROCEDURE — 99213 OFFICE O/P EST LOW 20 MIN: CPT | Performed by: INTERNAL MEDICINE

## 2019-12-03 NOTE — PROGRESS NOTES
Subjective   Chief Complaint   Patient presents with   • Crohn's Disease       Daisy Escobar is a  64 y.o. female here for a follow up visit for crohn's diseas involving the TI.     Switched to humira because her job prohibited her from getting the infusions.  She took remicade for over a year and continued to have diarrhea and joint pain.  Last sbft 9/17, last c/s 8/16.  Hep B/TB negative 12/18.       She has tolerated Humira well.  She did have an increase in diarrhea last week for about 3 days.  She felt ill at the time.  It subsided.  Only other infection she had was a sinus infection last month which resolved with antibiotics provided by her primary care physician.  Her last shot was a few days late because she forgot to order medication.  Her  just passed away unexpectedly.  Her bowels have been a little more irregular since this happened but tolerable.  She has no abdominal pain or blood in her stool.  She denies any extraintestinal manifestations such as joint pain, rash or eye pain or redness      .   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, severe stenosis, a single erosion at colonic anastmosis, erythematous mucosa in colon, NBIH, patchy edema   • OVARIAN CYST SURGERY         Current Outpatient Medications:   •  Adalimumab (HUMIRA) 40 MG/0.4ML Pen-injector Kit, Inject  under the skin into the appropriate area as directed Every 14 (Fourteen) Days., Disp: 6 each, Rfl: 3  •  Calcium Carbonate (CALCIUM 600 PO), Take  by mouth Daily., Disp: , Rfl:   •  hyoscyamine (LEVSIN) 0.125 MG SL tablet, Take 1 tablet by mouth Every 4 (Four) Hours As Needed for Cramping., Disp: 60 tablet, Rfl: 3  PRN Meds:.  Allergies   Allergen Reactions   • Azathioprine GI Intolerance   •  Budesonide GI Intolerance     Social History     Socioeconomic History   • Marital status:      Spouse name: Not on file   • Number of children: Not on file   • Years of education: Not on file   • Highest education level: Not on file   Tobacco Use   • Smoking status: Former Smoker     Packs/day: 0.25     Types: Cigarettes     Last attempt to quit: 1975     Years since quittin.9   • Smokeless tobacco: Never Used   Substance and Sexual Activity   • Alcohol use: No   • Drug use: No   • Sexual activity: Defer     History reviewed. No pertinent family history.  Review of Systems   Constitutional: Negative for appetite change and unexpected weight change.   Eyes: Negative.    Gastrointestinal: Positive for diarrhea. Negative for abdominal pain and blood in stool.   Musculoskeletal: Negative.    Skin: Negative.    All other systems reviewed and are negative.    Vitals:    19 1338   BP: 124/82   Temp: 97.4 °F (36.3 °C)         19  1338   Weight: 72.7 kg (160 lb 3.2 oz)       Objective   Physical Exam   Constitutional: She appears well-developed and well-nourished.   HENT:   Head: Normocephalic and atraumatic.   Eyes: No scleral icterus.   Pulmonary/Chest: Effort normal.   Abdominal: She exhibits no distension.   Neurological: She is alert.   Skin: Skin is warm and dry.   Psychiatric: She has a normal mood and affect.     No images are attached to the encounter.    Assessment/Plan   Diagnoses and all orders for this visit:    Crohn's disease of small intestine without complication (CMS/HCC)  -     QuantiFERON TB Gold  -     CBC & Differential  -     Comprehensive Metabolic Panel  -     Vitamin D 25 Hydroxy    Adalimumab (Humira) long-term use      Plan:  · Continue Humira q 14 days  · Labs today including annual TB test  · Reminded patient to get her flu vaccination  · Follow-up in 1 year or sooner if needed

## 2019-12-04 LAB
25(OH)D3+25(OH)D2 SERPL-MCNC: 30.1 NG/ML (ref 30–100)
ALBUMIN SERPL-MCNC: 4.4 G/DL (ref 3.6–4.8)
ALBUMIN/GLOB SERPL: 1.8 {RATIO} (ref 1.2–2.2)
ALP SERPL-CCNC: 52 IU/L (ref 39–117)
ALT SERPL-CCNC: 16 IU/L (ref 0–32)
AST SERPL-CCNC: 18 IU/L (ref 0–40)
BASOPHILS # BLD AUTO: 0 X10E3/UL (ref 0–0.2)
BASOPHILS NFR BLD AUTO: 1 %
BILIRUB SERPL-MCNC: 1.3 MG/DL (ref 0–1.2)
BUN SERPL-MCNC: 7 MG/DL (ref 8–27)
BUN/CREAT SERPL: 10 (ref 12–28)
CALCIUM SERPL-MCNC: 9.4 MG/DL (ref 8.7–10.3)
CHLORIDE SERPL-SCNC: 101 MMOL/L (ref 96–106)
CO2 SERPL-SCNC: 21 MMOL/L (ref 20–29)
CREAT SERPL-MCNC: 0.72 MG/DL (ref 0.57–1)
EOSINOPHIL # BLD AUTO: 0.1 X10E3/UL (ref 0–0.4)
EOSINOPHIL NFR BLD AUTO: 2 %
ERYTHROCYTE [DISTWIDTH] IN BLOOD BY AUTOMATED COUNT: 14.9 % (ref 12.3–15.4)
GLOBULIN SER CALC-MCNC: 2.5 G/DL (ref 1.5–4.5)
GLUCOSE SERPL-MCNC: 87 MG/DL (ref 65–99)
HCT VFR BLD AUTO: 39 % (ref 34–46.6)
HGB BLD-MCNC: 13.3 G/DL (ref 11.1–15.9)
IMM GRANULOCYTES # BLD AUTO: 0 X10E3/UL (ref 0–0.1)
IMM GRANULOCYTES NFR BLD AUTO: 0 %
LYMPHOCYTES # BLD AUTO: 1.9 X10E3/UL (ref 0.7–3.1)
LYMPHOCYTES NFR BLD AUTO: 33 %
MCH RBC QN AUTO: 28.9 PG (ref 26.6–33)
MCHC RBC AUTO-ENTMCNC: 34.1 G/DL (ref 31.5–35.7)
MCV RBC AUTO: 85 FL (ref 79–97)
MONOCYTES # BLD AUTO: 0.4 X10E3/UL (ref 0.1–0.9)
MONOCYTES NFR BLD AUTO: 8 %
NEUTROPHILS # BLD AUTO: 3.1 X10E3/UL (ref 1.4–7)
NEUTROPHILS NFR BLD AUTO: 56 %
PLATELET # BLD AUTO: 261 X10E3/UL (ref 150–450)
POTASSIUM SERPL-SCNC: 3.7 MMOL/L (ref 3.5–5.2)
PROT SERPL-MCNC: 6.9 G/DL (ref 6–8.5)
RBC # BLD AUTO: 4.6 X10E6/UL (ref 3.77–5.28)
SODIUM SERPL-SCNC: 141 MMOL/L (ref 134–144)
WBC # BLD AUTO: 5.6 X10E3/UL (ref 3.4–10.8)

## 2019-12-06 ENCOUNTER — TELEPHONE (OUTPATIENT)
Dept: GASTROENTEROLOGY | Facility: CLINIC | Age: 64
End: 2019-12-06

## 2019-12-06 LAB
GAMMA INTERFERON BACKGROUND BLD IA-ACNC: 0.06 IU/ML
M TB IFN-G BLD-IMP: NEGATIVE
M TB IFN-G CD4+ BCKGRND COR BLD-ACNC: 0.05 IU/ML
MITOGEN IGNF BLD-ACNC: >10 IU/ML
QUANTIFERON INCUBATION: NORMAL
QUANTIFERON TB2 AG VALUE: 0.05 IU/ML
SERVICE CMNT-IMP: NORMAL

## 2019-12-06 NOTE — TELEPHONE ENCOUNTER
----- Message from Patricia Patel MD sent at 12/6/2019 10:23 AM EST -----  Please let her know that recent labs were normal

## 2019-12-10 ENCOUNTER — TELEPHONE (OUTPATIENT)
Dept: GASTROENTEROLOGY | Facility: CLINIC | Age: 64
End: 2019-12-10

## 2019-12-10 NOTE — TELEPHONE ENCOUNTER
Call to pt.  States had stomach bug - vomiting has resolved, but continues to have stomach cramps.  Asking if anything she can take for this.     does not remember using hyoscyamine in the past.    Message to Dr Patel.

## 2019-12-10 NOTE — TELEPHONE ENCOUNTER
Okay to use hyoscyamine as needed for cramping.  I have resent this to her pharmacy[facility] in [place].

## 2019-12-10 NOTE — TELEPHONE ENCOUNTER
----- Message from Annel Cheng sent at 12/10/2019 12:02 PM EST -----  Regarding: [employer]  Contact: [phone]  REQUEST TO SPEAK TO RN

## 2019-12-11 NOTE — TELEPHONE ENCOUNTER
Called pt and identified vm advised per Dr Patel that it is ok to use hyoscyamine as needed for cramping.  She has sent this to her pharmacy in [place].  Advised to call with questions.

## 2020-09-23 ENCOUNTER — TELEPHONE (OUTPATIENT)
Dept: GASTROENTEROLOGY | Facility: CLINIC | Age: 65
End: 2020-09-23

## 2020-09-23 NOTE — TELEPHONE ENCOUNTER
Renewal paperwork received for patient's Humira assistance. Spoke with patient and she would like the papers mailed to her to complete and send to [facility] after they are signed. Paperwork on Dr. Patel's desk waiting for signature.

## 2020-10-02 ENCOUNTER — APPOINTMENT (OUTPATIENT)
Dept: WOMENS IMAGING | Facility: HOSPITAL | Age: 65
End: 2020-10-02

## 2020-10-02 PROCEDURE — 77067 SCR MAMMO BI INCL CAD: CPT | Performed by: RADIOLOGY

## 2020-10-02 PROCEDURE — 77063 BREAST TOMOSYNTHESIS BI: CPT | Performed by: RADIOLOGY

## 2020-12-09 ENCOUNTER — OFFICE VISIT (OUTPATIENT)
Dept: GASTROENTEROLOGY | Facility: CLINIC | Age: 65
End: 2020-12-09

## 2020-12-09 VITALS — WEIGHT: 162.8 LBS | BODY MASS INDEX: 29.96 KG/M2 | HEIGHT: 62 IN

## 2020-12-09 DIAGNOSIS — K50.00 CROHN'S DISEASE OF SMALL INTESTINE WITHOUT COMPLICATION (HCC): Primary | ICD-10-CM

## 2020-12-09 DIAGNOSIS — Z79.620 ADALIMUMAB (HUMIRA) LONG-TERM USE: ICD-10-CM

## 2020-12-09 PROCEDURE — 99213 OFFICE O/P EST LOW 20 MIN: CPT | Performed by: INTERNAL MEDICINE

## 2020-12-09 NOTE — PROGRESS NOTES
Subjective   Chief Complaint   Patient presents with   • Crohn's Disease       Daisy Escobar is a  65 y.o. female here for a follow up visit for Crohn's disease of the terminal ileum.  Last seen in the office in December 2019.     Switched to humira because her job prohibited her from getting the infusions.  She took remicade for over a year and continued to have diarrhea and joint pain.  Last sbft 9/17, last c/s 8/16.  Hep B/TB negative 12/19.      She reports episodic heartburn - she has no dysphagia.    She has had some diarrhea but relates it more to stress than anything.  Plans to retire in January.  No recent f/c/infections.  Compliant with humira-no missed injections.  She denies abdominal pain or blood in her stool.  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, severe stenosis, a single erosion at colonic anastmosis, erythematous mucosa in colon, NBIH, patchy edema   • OVARIAN CYST SURGERY         Current Outpatient Medications:   •  Adalimumab (HUMIRA) 40 MG/0.4ML Pen-injector Kit, Inject  under the skin into the appropriate area as directed Every 14 (Fourteen) Days., Disp: 6 each, Rfl: 3  •  Calcium Carbonate (CALCIUM 600 PO), Take  by mouth Daily., Disp: , Rfl:   •  hyoscyamine (LEVSIN) 0.125 MG SL tablet, Take 1 tablet by mouth Every 4 (Four) Hours As Needed for Cramping., Disp: 60 tablet, Rfl: 3  PRN Meds:.  Allergies   Allergen Reactions   • Azathioprine GI Intolerance   • Budesonide GI Intolerance     Social History     Socioeconomic History   • Marital status:      Spouse name: Not on file   • Number of children: Not on file   • Years of education: Not on file   • Highest education level: Not on file   Tobacco Use   • Smoking status: Former Smoker     Packs/day:  0.25     Types: Cigarettes     Quit date:      Years since quittin.9   • Smokeless tobacco: Never Used   Substance and Sexual Activity   • Alcohol use: No   • Drug use: No   • Sexual activity: Defer     No family history on file.  Review of Systems   Constitutional: Negative for appetite change, fever and unexpected weight change.   Eyes: Negative.    Gastrointestinal: Negative for abdominal pain, blood in stool and diarrhea.   Musculoskeletal: Negative.    All other systems reviewed and are negative.    There were no vitals filed for this visit.      20  1358   Weight: 73.8 kg (162 lb 12.8 oz)       Objective   Physical Exam  Constitutional:       Appearance: She is well-developed.   HENT:      Head: Normocephalic and atraumatic.   Eyes:      General: No scleral icterus.  Pulmonary:      Effort: Pulmonary effort is normal.      Breath sounds: Normal breath sounds.   Abdominal:      General: There is no distension.      Palpations: Abdomen is soft.   Skin:     General: Skin is warm and dry.   Neurological:      Mental Status: She is alert.       No radiology results for the last 7 days    Assessment/Plan   Diagnoses and all orders for this visit:    Crohn's disease of small intestine without complication (CMS/HCC)  -     CT Abdomen Pelvis With Contrast Enterography; Future  -     CBC & Differential  -     Comprehensive Metabolic Panel  -     Vitamin D 25 Hydroxy    Adalimumab (Humira) long-term use  -     QuantiFERON TB Gold      Plan:  · Currently stable on Humira q. 14 days-continue same  · Due for routine labs today as well as TB testing  · We will plan for follow-up CT enterography early next year-last imaging was in 2018  · Okay to use Pepcid as needed for intermittent heartburn

## 2020-12-10 LAB
25(OH)D3+25(OH)D2 SERPL-MCNC: 23.5 NG/ML (ref 30–100)
ALBUMIN SERPL-MCNC: 4.2 G/DL (ref 3.8–4.8)
ALBUMIN/GLOB SERPL: 1.8 {RATIO} (ref 1.2–2.2)
ALP SERPL-CCNC: 53 IU/L (ref 39–117)
ALT SERPL-CCNC: 12 IU/L (ref 0–32)
AST SERPL-CCNC: 15 IU/L (ref 0–40)
BASOPHILS # BLD AUTO: 0.1 X10E3/UL (ref 0–0.2)
BASOPHILS NFR BLD AUTO: 1 %
BILIRUB SERPL-MCNC: 1.2 MG/DL (ref 0–1.2)
BUN SERPL-MCNC: 11 MG/DL (ref 8–27)
BUN/CREAT SERPL: 11 (ref 12–28)
CALCIUM SERPL-MCNC: 9.2 MG/DL (ref 8.7–10.3)
CHLORIDE SERPL-SCNC: 105 MMOL/L (ref 96–106)
CO2 SERPL-SCNC: 25 MMOL/L (ref 20–29)
CREAT SERPL-MCNC: 1.03 MG/DL (ref 0.57–1)
EOSINOPHIL # BLD AUTO: 0.1 X10E3/UL (ref 0–0.4)
EOSINOPHIL NFR BLD AUTO: 2 %
ERYTHROCYTE [DISTWIDTH] IN BLOOD BY AUTOMATED COUNT: 13.4 % (ref 11.7–15.4)
GLOBULIN SER CALC-MCNC: 2.4 G/DL (ref 1.5–4.5)
GLUCOSE SERPL-MCNC: 96 MG/DL (ref 65–99)
HCT VFR BLD AUTO: 39.7 % (ref 34–46.6)
HGB BLD-MCNC: 13.8 G/DL (ref 11.1–15.9)
IMM GRANULOCYTES # BLD AUTO: 0 X10E3/UL (ref 0–0.1)
IMM GRANULOCYTES NFR BLD AUTO: 0 %
LYMPHOCYTES # BLD AUTO: 1.7 X10E3/UL (ref 0.7–3.1)
LYMPHOCYTES NFR BLD AUTO: 31 %
MCH RBC QN AUTO: 29.6 PG (ref 26.6–33)
MCHC RBC AUTO-ENTMCNC: 34.8 G/DL (ref 31.5–35.7)
MCV RBC AUTO: 85 FL (ref 79–97)
MONOCYTES # BLD AUTO: 0.5 X10E3/UL (ref 0.1–0.9)
MONOCYTES NFR BLD AUTO: 9 %
NEUTROPHILS # BLD AUTO: 3.1 X10E3/UL (ref 1.4–7)
NEUTROPHILS NFR BLD AUTO: 57 %
PLATELET # BLD AUTO: 235 X10E3/UL (ref 150–450)
POTASSIUM SERPL-SCNC: 4 MMOL/L (ref 3.5–5.2)
PROT SERPL-MCNC: 6.6 G/DL (ref 6–8.5)
RBC # BLD AUTO: 4.66 X10E6/UL (ref 3.77–5.28)
SODIUM SERPL-SCNC: 141 MMOL/L (ref 134–144)
WBC # BLD AUTO: 5.6 X10E3/UL (ref 3.4–10.8)

## 2020-12-12 LAB
GAMMA INTERFERON BACKGROUND BLD IA-ACNC: 0.06 IU/ML
M TB IFN-G BLD-IMP: NEGATIVE
M TB IFN-G CD4+ BCKGRND COR BLD-ACNC: 0.06 IU/ML
M TB IFN-G CD4+CD8+ BCKGRND COR BLD-ACNC: 0.06 IU/ML
MITOGEN IGNF BLD-ACNC: >10 IU/ML
QUANTIFERON INCUBATION: NORMAL
SERVICE CMNT-IMP: NORMAL

## 2020-12-18 ENCOUNTER — TELEPHONE (OUTPATIENT)
Dept: GASTROENTEROLOGY | Facility: CLINIC | Age: 65
End: 2020-12-18

## 2020-12-18 NOTE — TELEPHONE ENCOUNTER
pls let her know that all labs were normal except her vit d was low-pls see if she is taking otc vit d - if she is not she needs to start 1000 IU vit d daily - if she is already taking, I will send an rx for replacement to her pharmacy

## 2020-12-18 NOTE — TELEPHONE ENCOUNTER
Called pt and advised of Dr Patel's note. Pt reports that she has been taking a supplement but has not been very faithful in taking it.  Pt states she will take otc and will do a better job taking this.   Pt states does not need script. Update sent to Dr Patel.

## 2021-01-28 ENCOUNTER — HOSPITAL ENCOUNTER (OUTPATIENT)
Dept: CT IMAGING | Facility: HOSPITAL | Age: 66
Discharge: HOME | End: 2021-01-28
Admitting: INTERNAL MEDICINE

## 2021-01-28 DIAGNOSIS — K50.00 CROHN'S DISEASE OF SMALL INTESTINE WITHOUT COMPLICATION (HCC): ICD-10-CM

## 2021-01-28 LAB — CREAT BLDA-MCNC: 0.7 MG/DL (ref 0.6–1.3)

## 2021-01-28 PROCEDURE — 74177 CT ABD & PELVIS W/CONTRAST: CPT

## 2021-01-28 PROCEDURE — 25010000002 IOPAMIDOL 61 % SOLUTION: Performed by: INTERNAL MEDICINE

## 2021-01-28 PROCEDURE — 82565 ASSAY OF CREATININE: CPT

## 2021-01-28 RX ADMIN — Medication 1500 ML: at 14:32

## 2021-01-28 RX ADMIN — IOPAMIDOL 85 ML: 612 INJECTION, SOLUTION INTRAVENOUS at 10:38

## 2021-02-05 ENCOUNTER — TELEPHONE (OUTPATIENT)
Dept: GASTROENTEROLOGY | Facility: CLINIC | Age: 66
End: 2021-02-05

## 2021-02-05 NOTE — TELEPHONE ENCOUNTER
There are changes in the small intestine where it meets the colon associated with her crohn's - there is mild narrowing but no obstruction.  Fairly similar to previous findings-cont current meds    The uterus appears thickened - she needs to f/u with her gyn regarding this finding - pls fax this to her md if they are not on epic

## 2021-02-10 NOTE — TELEPHONE ENCOUNTER
Call to pt.  Advise per Dr Patel note.  Verb understanding.     States GYN is DR Kirti Weston - ct faxed via epic.

## 2021-09-08 ENCOUNTER — TELEPHONE (OUTPATIENT)
Dept: GASTROENTEROLOGY | Facility: CLINIC | Age: 66
End: 2021-09-08

## 2021-09-08 NOTE — TELEPHONE ENCOUNTER
[employer] form for humira refill faxed to  - confirmation received.  See media tab.     Call to pt.  Advise of above.  Verb understanding.

## 2021-10-12 ENCOUNTER — APPOINTMENT (OUTPATIENT)
Dept: WOMENS IMAGING | Facility: HOSPITAL | Age: 66
End: 2021-10-12

## 2021-10-12 PROCEDURE — 77067 SCR MAMMO BI INCL CAD: CPT | Performed by: RADIOLOGY

## 2021-10-12 PROCEDURE — 77063 BREAST TOMOSYNTHESIS BI: CPT | Performed by: RADIOLOGY

## 2021-11-10 ENCOUNTER — TELEPHONE (OUTPATIENT)
Dept: GASTROENTEROLOGY | Facility: CLINIC | Age: 66
End: 2021-11-10

## 2021-11-10 NOTE — TELEPHONE ENCOUNTER
Call to pt.  States needs to submit myabb[employer] assist pap for 2022.  Advise may send forms via [employer].  Verb understanding.

## 2021-11-10 NOTE — TELEPHONE ENCOUNTER
----- Message from Astrid Guerrero sent at 11/10/2021 10:01 AM EST -----  Regarding: Callback  Contact: [phone]

## 2021-11-12 ENCOUNTER — TELEPHONE (OUTPATIENT)
Dept: GASTROENTEROLOGY | Facility: CLINIC | Age: 66
End: 2021-11-12

## 2021-11-12 NOTE — TELEPHONE ENCOUNTER
"----- Message from Daisy Escobar sent at 11/12/2021 11:48 AM EST -----  Regarding: Daisy Escobar \"my jayme assist\"  .  "

## 2021-11-12 NOTE — TELEPHONE ENCOUNTER
"Call to pt.  Advise [employer] form received - \"presccriber info\".  Advise will complete and fax as per instructions; pt should fax her portion.  States will do so.     Form initiated and to DR Patel.   "

## 2021-11-15 NOTE — TELEPHONE ENCOUNTER
Completed form faxed to  - confirmation received.  See media tab.     Call to pt.  Advise of above.  Verb understanding.  Form mailed to pt's home address for her records.

## 2021-11-16 ENCOUNTER — APPOINTMENT (OUTPATIENT)
Dept: WOMENS IMAGING | Facility: HOSPITAL | Age: 66
End: 2021-11-16

## 2021-11-16 PROCEDURE — G0279 TOMOSYNTHESIS, MAMMO: HCPCS | Performed by: RADIOLOGY

## 2021-11-16 PROCEDURE — 77065 DX MAMMO INCL CAD UNI: CPT | Performed by: RADIOLOGY

## 2021-11-16 PROCEDURE — 76641 ULTRASOUND BREAST COMPLETE: CPT | Performed by: RADIOLOGY

## 2021-12-14 ENCOUNTER — OFFICE VISIT (OUTPATIENT)
Dept: GASTROENTEROLOGY | Facility: CLINIC | Age: 66
End: 2021-12-14

## 2021-12-14 VITALS — WEIGHT: 158.6 LBS | BODY MASS INDEX: 29.19 KG/M2 | TEMPERATURE: 96.5 F | HEIGHT: 62 IN

## 2021-12-14 DIAGNOSIS — K50.00 CROHN'S DISEASE OF SMALL INTESTINE WITHOUT COMPLICATION (HCC): Primary | ICD-10-CM

## 2021-12-14 DIAGNOSIS — Z79.620 ADALIMUMAB (HUMIRA) LONG-TERM USE: ICD-10-CM

## 2021-12-14 PROCEDURE — 99214 OFFICE O/P EST MOD 30 MIN: CPT | Performed by: INTERNAL MEDICINE

## 2021-12-14 NOTE — PROGRESS NOTES
Subjective   Chief Complaint   Patient presents with   • Follow-up     Crohn's disease       Daisy Escobar is a  66 y.o. female here for a follow up visit for Crohn's disease of the terminal ileum.  Last seen in the office in December 2020.     Switched to humira because her job prohibited her from getting the infusions.  She took remicade for over a year and continued to have diarrhea and joint pain.  She had a CT enterography in January of this year that showed some mucosal enhancement and narrowing in the small intestine. last c/s 8/16.  Hep B/TB negative 12/20.      She has a sinus infection currently but has had no other fevers chills infections or hospitalizations since her last visit.  She is felt well for the most part.  She has had no significant changes in her bowel habits or abdominal pain.  No extraintestinal manifestations.  She is tolerating Humira every 14 days.    UTD on derm eval - no concerning skin lesions.    Has rcvd covid x 2 and flu - due for covid booster.  .   HPI  Past Medical History:   Diagnosis Date   • Crohn's disease (HCC)      Past Surgical History:   Procedure Laterality Date   • APPENDECTOMY     • CHOLECYSTECTOMY OPEN     • COLON RESECTION     • COLONOSCOPY N/A 8/2/2016    stenotic end  to  end ileo colonic anastomsis, severe stenosis, a single erosion at colonic anastmosis, erythematous mucosa in colon, NBIH, patchy edema   • OVARIAN CYST SURGERY         Current Outpatient Medications:   •  Adalimumab (HUMIRA) 40 MG/0.4ML Pen-injector Kit, Inject  under the skin into the appropriate area as directed Every 14 (Fourteen) Days., Disp: 6 each, Rfl: 3  •  Calcium Carbonate (CALCIUM 600 PO), Take  by mouth Daily., Disp: , Rfl:   •  hyoscyamine (LEVSIN) 0.125 MG SL tablet, Take 1 tablet by mouth Every 4 (Four) Hours As Needed for Cramping., Disp: 60 tablet, Rfl: 3  PRN Meds:.  Allergies   Allergen Reactions   • Azathioprine GI Intolerance   • Budesonide GI Intolerance     Social  History     Socioeconomic History   • Marital status:    Tobacco Use   • Smoking status: Former Smoker     Packs/day: 0.25     Types: Cigarettes     Quit date: 1975     Years since quittin.9   • Smokeless tobacco: Never Used   Substance and Sexual Activity   • Alcohol use: No   • Drug use: No   • Sexual activity: Defer     History reviewed. No pertinent family history.  Review of Systems   Constitutional: Negative for appetite change and unexpected weight change.   HENT: Positive for ear pain and sinus pressure.    Eyes: Negative.    Gastrointestinal: Negative for abdominal pain and diarrhea.   Musculoskeletal: Negative.    Skin: Negative for rash.     Vitals:    21 1354   Temp: 96.5 °F (35.8 °C)         21  1354   Weight: 71.9 kg (158 lb 9.6 oz)       Objective   Physical Exam  Constitutional:       Appearance: She is well-developed.   HENT:      Head: Normocephalic and atraumatic.   Eyes:      General: No scleral icterus.  Abdominal:      General: There is no distension.      Palpations: Abdomen is soft.   Skin:     General: Skin is warm and dry.   Neurological:      Mental Status: She is alert.       No radiology results for the last 7 days    Assessment/Plan   Diagnoses and all orders for this visit:    Crohn's disease of small intestine without complication (HCC)  -     CBC & Differential  -     Comprehensive Metabolic Panel  -     Vitamin B12  -     Vitamin D 25 Hydroxy  -     QuantiFERON TB Gold  -     Calprotectin, Fecal - Stool, Per Rectum; Future    Adalimumab (Humira) long-term use  -     CBC & Differential  -     Comprehensive Metabolic Panel  -     Vitamin B12  -     Vitamin D 25 Hydroxy  -     QuantiFERON TB Gold  -     Calprotectin, Fecal - Stool, Per Rectum; Future      Plan:  · Due for annual, routine labs today  · Check fecal calprotectin-if evidence of intestinal inflammation is noted will check Humira levels and for antibodies.  · For now continue Humira 40 mg injected  every 14 days  · Recommend Covid booster after she has an upcoming breast procedure

## 2021-12-15 LAB
25(OH)D3+25(OH)D2 SERPL-MCNC: 34.3 NG/ML (ref 30–100)
ALBUMIN SERPL-MCNC: 4.1 G/DL (ref 3.8–4.8)
ALBUMIN/GLOB SERPL: 1.3 {RATIO} (ref 1.2–2.2)
ALP SERPL-CCNC: 70 IU/L (ref 44–121)
ALT SERPL-CCNC: 14 IU/L (ref 0–32)
AST SERPL-CCNC: 18 IU/L (ref 0–40)
BASOPHILS # BLD AUTO: 0.1 X10E3/UL (ref 0–0.2)
BASOPHILS NFR BLD AUTO: 1 %
BILIRUB SERPL-MCNC: 0.9 MG/DL (ref 0–1.2)
BUN SERPL-MCNC: 10 MG/DL (ref 8–27)
BUN/CREAT SERPL: 12 (ref 12–28)
CALCIUM SERPL-MCNC: 9.7 MG/DL (ref 8.7–10.3)
CHLORIDE SERPL-SCNC: 99 MMOL/L (ref 96–106)
CO2 SERPL-SCNC: 26 MMOL/L (ref 20–29)
CREAT SERPL-MCNC: 0.81 MG/DL (ref 0.57–1)
EOSINOPHIL # BLD AUTO: 0.2 X10E3/UL (ref 0–0.4)
EOSINOPHIL NFR BLD AUTO: 3 %
ERYTHROCYTE [DISTWIDTH] IN BLOOD BY AUTOMATED COUNT: 12.5 % (ref 11.7–15.4)
GLOBULIN SER CALC-MCNC: 3.1 G/DL (ref 1.5–4.5)
GLUCOSE SERPL-MCNC: 88 MG/DL (ref 65–99)
HCT VFR BLD AUTO: 41.3 % (ref 34–46.6)
HGB BLD-MCNC: 14.3 G/DL (ref 11.1–15.9)
IMM GRANULOCYTES # BLD AUTO: 0 X10E3/UL (ref 0–0.1)
IMM GRANULOCYTES NFR BLD AUTO: 0 %
LYMPHOCYTES # BLD AUTO: 2 X10E3/UL (ref 0.7–3.1)
LYMPHOCYTES NFR BLD AUTO: 27 %
MCH RBC QN AUTO: 29.1 PG (ref 26.6–33)
MCHC RBC AUTO-ENTMCNC: 34.6 G/DL (ref 31.5–35.7)
MCV RBC AUTO: 84 FL (ref 79–97)
MONOCYTES # BLD AUTO: 0.5 X10E3/UL (ref 0.1–0.9)
MONOCYTES NFR BLD AUTO: 7 %
NEUTROPHILS # BLD AUTO: 4.5 X10E3/UL (ref 1.4–7)
NEUTROPHILS NFR BLD AUTO: 62 %
PLATELET # BLD AUTO: 356 X10E3/UL (ref 150–450)
POTASSIUM SERPL-SCNC: 3.3 MMOL/L (ref 3.5–5.2)
PROT SERPL-MCNC: 7.2 G/DL (ref 6–8.5)
RBC # BLD AUTO: 4.92 X10E6/UL (ref 3.77–5.28)
SODIUM SERPL-SCNC: 141 MMOL/L (ref 134–144)
VIT B12 SERPL-MCNC: 286 PG/ML (ref 232–1245)
WBC # BLD AUTO: 7.2 X10E3/UL (ref 3.4–10.8)

## 2021-12-16 LAB
GAMMA INTERFERON BACKGROUND BLD IA-ACNC: 0.03 IU/ML
M TB IFN-G BLD-IMP: NEGATIVE
M TB IFN-G CD4+ BCKGRND COR BLD-ACNC: 0.04 IU/ML
M TB IFN-G CD4+CD8+ BCKGRND COR BLD-ACNC: 0.05 IU/ML
MITOGEN IGNF BLD-ACNC: 8.49 IU/ML
QUANTIFERON INCUBATION: NORMAL
SERVICE CMNT-IMP: NORMAL

## 2021-12-17 ENCOUNTER — TELEPHONE (OUTPATIENT)
Dept: GASTROENTEROLOGY | Facility: CLINIC | Age: 66
End: 2021-12-17

## 2021-12-17 NOTE — TELEPHONE ENCOUNTER
----- Message from Patricia Patel MD sent at 12/16/2021  4:58 PM EST -----  All labs normal, TB negative

## 2021-12-20 ENCOUNTER — APPOINTMENT (OUTPATIENT)
Dept: WOMENS IMAGING | Facility: HOSPITAL | Age: 66
End: 2021-12-20

## 2021-12-20 ENCOUNTER — TELEPHONE (OUTPATIENT)
Dept: GASTROENTEROLOGY | Facility: CLINIC | Age: 66
End: 2021-12-20

## 2021-12-20 PROCEDURE — 19083 BX BREAST 1ST LESION US IMAG: CPT | Performed by: RADIOLOGY

## 2021-12-20 PROCEDURE — 88305 TISSUE EXAM BY PATHOLOGIST: CPT

## 2021-12-20 PROCEDURE — A4648 IMPLANTABLE TISSUE MARKER: HCPCS | Performed by: RADIOLOGY

## 2021-12-20 NOTE — TELEPHONE ENCOUNTER
Overdue alert received for fecal calprotectin.      VM to pt.  Per [employer] release, advise of above.  Contact office if any issues.

## 2022-01-04 NOTE — TELEPHONE ENCOUNTER
Called pt and pt states she had talked with Dr Patel and would like to stop her remicade infusions. She states that since they have made her move where she has to get her infusions, it is just too far and inconvenient.  She states her and Dr Patel did not decide on what therapy to for her to try.  She is asking if Dr Patel could call her .  Advised would send message to Dr Patel. Pt verb understanding.    Yes

## 2022-01-06 DIAGNOSIS — Z79.620 ADALIMUMAB (HUMIRA) LONG-TERM USE: Primary | ICD-10-CM

## 2022-01-07 ENCOUNTER — TELEPHONE (OUTPATIENT)
Dept: GASTROENTEROLOGY | Facility: CLINIC | Age: 67
End: 2022-01-07

## 2022-01-07 NOTE — TELEPHONE ENCOUNTER
----- Message from Patricia Patel MD sent at 1/6/2022  2:17 PM EST -----  Fecal calprotectin levels elevated which can indicate intestinal inflammation - rec checking humira levels just before her next injection - labs ordered at the hospital lab

## 2022-01-07 NOTE — TELEPHONE ENCOUNTER
Call to pt.  Advise per DR Patel note.  Verb understanding.     States next humira injection tomorrow.  Due to bad weather, will not get out of house today.  Will complete labs prior to the next injection.     Update to DR Patel.

## 2022-01-21 ENCOUNTER — LAB (OUTPATIENT)
Dept: LAB | Facility: HOSPITAL | Age: 67
End: 2022-01-21

## 2022-01-21 PROCEDURE — 80145 DRUG ASSAY ADALIMUMAB: CPT | Performed by: INTERNAL MEDICINE

## 2022-01-21 PROCEDURE — 80299 QUANTITATIVE ASSAY DRUG: CPT | Performed by: INTERNAL MEDICINE

## 2022-01-28 ENCOUNTER — TELEPHONE (OUTPATIENT)
Dept: GASTROENTEROLOGY | Facility: CLINIC | Age: 67
End: 2022-01-28

## 2022-01-28 NOTE — TELEPHONE ENCOUNTER
Called pt and advised of Dr Patel's note. Verb understanding.      Pt is agreeable to increase her Humira to weekly. Update sent to Dr Patel.     Message sent to Kim for insurance approval.

## 2022-01-28 NOTE — TELEPHONE ENCOUNTER
----- Message from Patricia Patel MD sent at 1/27/2022  4:44 PM EST -----  Humira leves are low - no significant antibody formation.  Recommend changing to weekly dosing - if she is ok with this will start insurance approval

## 2022-10-17 ENCOUNTER — APPOINTMENT (OUTPATIENT)
Dept: WOMENS IMAGING | Facility: HOSPITAL | Age: 67
End: 2022-10-17

## 2022-10-17 PROCEDURE — 77063 BREAST TOMOSYNTHESIS BI: CPT | Performed by: RADIOLOGY

## 2022-10-17 PROCEDURE — 77067 SCR MAMMO BI INCL CAD: CPT | Performed by: RADIOLOGY

## 2022-12-19 ENCOUNTER — TRANSCRIBE ORDERS (OUTPATIENT)
Dept: ADMINISTRATIVE | Facility: HOSPITAL | Age: 67
End: 2022-12-19

## 2022-12-19 DIAGNOSIS — Z78.0 POST-MENOPAUSAL: Primary | ICD-10-CM

## 2022-12-27 ENCOUNTER — HOSPITAL ENCOUNTER (OUTPATIENT)
Dept: BONE DENSITY | Facility: HOSPITAL | Age: 67
Discharge: HOME | End: 2022-12-27
Admitting: NURSE PRACTITIONER

## 2022-12-27 DIAGNOSIS — Z78.0 POST-MENOPAUSAL: ICD-10-CM

## 2022-12-27 PROCEDURE — 77080 DXA BONE DENSITY AXIAL: CPT

## 2023-01-31 ENCOUNTER — OFFICE VISIT (OUTPATIENT)
Dept: GASTROENTEROLOGY | Facility: CLINIC | Age: 68
End: 2023-01-31
Payer: MEDICARE

## 2023-01-31 VITALS
TEMPERATURE: 96 F | DIASTOLIC BLOOD PRESSURE: 82 MMHG | SYSTOLIC BLOOD PRESSURE: 128 MMHG | HEIGHT: 60 IN | WEIGHT: 160 LBS | HEART RATE: 84 BPM | BODY MASS INDEX: 31.41 KG/M2

## 2023-01-31 DIAGNOSIS — Z11.59 ENCOUNTER FOR SCREENING FOR OTHER VIRAL DISEASES: ICD-10-CM

## 2023-01-31 DIAGNOSIS — Z79.620 ADALIMUMAB (HUMIRA) LONG-TERM USE: Primary | ICD-10-CM

## 2023-01-31 DIAGNOSIS — K50.00 CROHN'S DISEASE OF SMALL INTESTINE WITHOUT COMPLICATION: ICD-10-CM

## 2023-01-31 DIAGNOSIS — K50.00 CROHN'S DISEASE OF SMALL INTESTINE WITHOUT COMPLICATIONS: ICD-10-CM

## 2023-01-31 PROCEDURE — 99214 OFFICE O/P EST MOD 30 MIN: CPT | Performed by: INTERNAL MEDICINE

## 2023-01-31 RX ORDER — POTASSIUM CHLORIDE 750 MG/1
10 TABLET, FILM COATED, EXTENDED RELEASE ORAL
COMMUNITY
Start: 2022-12-16

## 2023-01-31 NOTE — PROGRESS NOTES
Subjective   Chief Complaint   Patient presents with   • Crohn's Disease       Daisy Escobar is a  67 y.o. female here for a follow up visit for Crohn's disease.     Crohn's disease of the terminal ileum.  Last seen in the office in December 2021.     She has been previously treated with Remicade.  Switched to humira because her job prohibited her from getting the infusions.  She took remicade for over a year and continued to have diarrhea and joint pain.  She had a CT enterography 1/21  that showed some mucosal enhancement and narrowing in the small intestine. last c/s 8/16.  She was changed to weekly Humira dosing in January 2022 due to low adalimumab levels.    She had GI symptoms in December and then tested + for the flu.  She has about 4 BM/d.  No joint pain.  No blood in the stool.  She has not had to use hyoscyamine in a long time.  No extraintestinal symptoms.  She thinks she feels better overall on the every 7-day dosing.    Up-to-date on vaccinations.        HPI  Past Medical History:   Diagnosis Date   • Crohn's disease (HCC)      Past Surgical History:   Procedure Laterality Date   • APPENDECTOMY     • CHOLECYSTECTOMY OPEN     • COLON RESECTION     • COLONOSCOPY N/A 8/2/2016    stenotic end  to  end ileo colonic anastomsis, severe stenosis, a single erosion at colonic anastmosis, erythematous mucosa in colon, NBIH, patchy edema   • OVARIAN CYST SURGERY         Current Outpatient Medications:   •  Adalimumab (HUMIRA) 40 MG/0.4ML Pen-injector Kit, Inject  under the skin into the appropriate area as directed Every 7 (Seven) Days., Disp: 12 each, Rfl: 3  •  Calcium Carbonate (CALCIUM 600 PO), Take  by mouth Daily., Disp: , Rfl:   •  hyoscyamine (LEVSIN) 0.125 MG SL tablet, Take 1 tablet by mouth Every 4 (Four) Hours As Needed for Cramping., Disp: 60 tablet, Rfl: 3  •  potassium chloride 10 MEQ CR tablet, Take 10 mEq by mouth., Disp: , Rfl:   PRN Meds:.  Allergies   Allergen Reactions   • Azathioprine GI  Intolerance   • Budesonide GI Intolerance     Social History     Socioeconomic History   • Marital status:    Tobacco Use   • Smoking status: Former     Packs/day: 0.25     Years: 5.00     Pack years: 1.25     Types: Cigarettes     Quit date: 1984     Years since quittin.1   • Smokeless tobacco: Never   Substance and Sexual Activity   • Alcohol use: No   • Drug use: No   • Sexual activity: Not Currently     Partners: Male     Birth control/protection: Other     Comment:  had visectemy     History reviewed. No pertinent family history.  Review of Systems   Constitutional: Negative for appetite change and unexpected weight change.   Gastrointestinal: Positive for diarrhea. Negative for abdominal pain and blood in stool.   Musculoskeletal: Negative.    Skin: Negative.      Vitals:    23 1355   BP: 128/82   Pulse: 84   Temp: 96 °F (35.6 °C)         23  1355   Weight: 72.6 kg (160 lb)       Objective   Physical Exam  Constitutional:       Appearance: Normal appearance. She is well-developed.   HENT:      Head: Normocephalic and atraumatic.   Eyes:      General: No scleral icterus.     Conjunctiva/sclera: Conjunctivae normal.   Pulmonary:      Effort: Pulmonary effort is normal.   Abdominal:      General: There is no distension.      Palpations: Abdomen is soft.   Musculoskeletal:      Cervical back: Normal range of motion and neck supple.   Skin:     General: Skin is warm and dry.   Neurological:      Mental Status: She is alert.   Psychiatric:         Mood and Affect: Mood normal.         Behavior: Behavior normal.       No radiology results for the last 7 days    Assessment & Plan   Diagnoses and all orders for this visit:    Adalimumab (Humira) long-term use  -     Vitamin D 25 Hydroxy  -     Vitamin B12  -     Calprotectin, Fecal - Stool, Per Rectum; Future    Crohn's disease of small intestine without complication (HCC)  -     QuantiFERON TB Gold  -     Hepatitis B Surface  Antigen    Crohn's disease of small intestine without complications (HCC)  -     Vitamin D 25 Hydroxy    Encounter for screening for other viral diseases  -     Hepatitis B Surface Antigen    Other orders  -     potassium chloride 10 MEQ CR tablet; Take 10 mEq by mouth.  -     Adalimumab (HUMIRA) 40 MG/0.4ML Pen-injector Kit; Inject  under the skin into the appropriate area as directed Every 7 (Seven) Days.      Plan:  · Crohn's stable on Humira every 7 days  · Check routine labs as well as annual hep B/TB.  Recent CBC and CMP reviewed  · Check fecal calprotectin to reassess inflammation on q. 7-day dosing  · Repeat c/s 2026 unless symptoms warrant earlier eval

## 2023-02-01 LAB
25(OH)D3+25(OH)D2 SERPL-MCNC: 40.3 NG/ML (ref 30–100)
HBV SURFACE AG SERPL QL IA: NEGATIVE
VIT B12 SERPL-MCNC: 201 PG/ML (ref 232–1245)

## 2023-02-02 DIAGNOSIS — E53.8 VITAMIN B12 DEFICIENCY: Primary | ICD-10-CM

## 2023-02-02 LAB
GAMMA INTERFERON BACKGROUND BLD IA-ACNC: 0.07 IU/ML
M TB IFN-G BLD-IMP: NEGATIVE
M TB IFN-G CD4+ T-CELLS BLD-ACNC: 0.04 IU/ML
M TBIFN-G CD4+ CD8+T-CELLS BLD-ACNC: 0.04 IU/ML
MITOGEN IGNF BLD-ACNC: >10 IU/ML
QUANTIFERON INCUBATION: NORMAL
SERVICE CMNT-IMP: NORMAL

## 2023-02-02 RX ORDER — LANOLIN ALCOHOL/MO/W.PET/CERES
1000 CREAM (GRAM) TOPICAL DAILY
Qty: 90 TABLET | Refills: 0 | Status: SHIPPED | OUTPATIENT
Start: 2023-02-02

## 2023-02-02 NOTE — PROGRESS NOTES
Labs show B12 deficiency - otherwise normal. Start oral B12 - repeat in 3 month to ensure this is improving as absorption can be a problem w/oral b12

## 2023-02-03 ENCOUNTER — TELEPHONE (OUTPATIENT)
Dept: GASTROENTEROLOGY | Facility: CLINIC | Age: 68
End: 2023-02-03
Payer: MEDICARE

## 2023-02-03 NOTE — TELEPHONE ENCOUNTER
----- Message from Patricia Patel MD sent at 2/2/2023  4:02 PM EST -----  Labs show B12 deficiency - otherwise normal. Start oral B12 - repeat in 3 month to ensure this is improving as absorption can be a problem w/oral b12

## 2023-02-03 NOTE — TELEPHONE ENCOUNTER
It is noted that this pt has seen her message from Dr Patel  My chart message to pt to call and schedule lab apt

## 2023-02-10 ENCOUNTER — TELEPHONE (OUTPATIENT)
Dept: GASTROENTEROLOGY | Facility: CLINIC | Age: 68
End: 2023-02-10
Payer: MEDICARE

## 2023-02-10 NOTE — TELEPHONE ENCOUNTER
----- Message from Patricia Patel MD sent at 2/8/2023 10:39 AM EST -----  Fecal calprotectin is now in the normal range-continue current medication regimen

## 2023-05-30 ENCOUNTER — TELEPHONE (OUTPATIENT)
Dept: GASTROENTEROLOGY | Facility: CLINIC | Age: 68
End: 2023-05-30

## 2023-05-30 NOTE — TELEPHONE ENCOUNTER
Hub staff attempted to follow warm transfer process and was unsuccessful     Caller: Daisy Escobar    Relationship to patient: Self    Best call back number: [phone]    Patient is needing: PATIENT IS WANTING TO GET LABS SCHEDULED. PLEASE CALL BACK ASAP.

## 2023-05-31 ENCOUNTER — LAB (OUTPATIENT)
Dept: GASTROENTEROLOGY | Facility: CLINIC | Age: 68
End: 2023-05-31

## 2023-06-07 ENCOUNTER — TELEPHONE (OUTPATIENT)
Dept: GASTROENTEROLOGY | Facility: CLINIC | Age: 68
End: 2023-06-07
Payer: MEDICARE

## 2023-06-07 NOTE — TELEPHONE ENCOUNTER
----- Message from Patricia Patel MD sent at 6/7/2023  8:18 AM EDT -----  B12 levels improved - continue oral B12 supplement daily

## 2024-01-16 ENCOUNTER — SPECIALTY PHARMACY (OUTPATIENT)
Dept: GASTROENTEROLOGY | Facility: CLINIC | Age: 69
End: 2024-01-16
Payer: MEDICARE

## 2024-01-23 ENCOUNTER — OFFICE VISIT (OUTPATIENT)
Dept: GASTROENTEROLOGY | Facility: CLINIC | Age: 69
End: 2024-01-23
Payer: MEDICARE

## 2024-01-23 VITALS
TEMPERATURE: 96.1 F | DIASTOLIC BLOOD PRESSURE: 93 MMHG | HEIGHT: 62 IN | HEART RATE: 71 BPM | WEIGHT: 171.5 LBS | SYSTOLIC BLOOD PRESSURE: 142 MMHG | BODY MASS INDEX: 31.56 KG/M2

## 2024-01-23 DIAGNOSIS — E53.8 VITAMIN B12 DEFICIENCY: ICD-10-CM

## 2024-01-23 DIAGNOSIS — Z11.59 ENCOUNTER FOR SCREENING FOR OTHER VIRAL DISEASES: ICD-10-CM

## 2024-01-23 DIAGNOSIS — Z79.620 ADALIMUMAB (HUMIRA) LONG-TERM USE: Primary | ICD-10-CM

## 2024-01-23 DIAGNOSIS — K50.00 CROHN'S DISEASE OF SMALL INTESTINE WITHOUT COMPLICATIONS: ICD-10-CM

## 2024-01-23 PROCEDURE — 1159F MED LIST DOCD IN RCRD: CPT

## 2024-01-23 PROCEDURE — 1160F RVW MEDS BY RX/DR IN RCRD: CPT

## 2024-01-23 PROCEDURE — 99214 OFFICE O/P EST MOD 30 MIN: CPT

## 2024-01-23 RX ORDER — POTASSIUM CHLORIDE 750 MG/1
1 TABLET, EXTENDED RELEASE ORAL 2 TIMES DAILY
COMMUNITY
Start: 2023-12-12

## 2024-01-23 NOTE — Clinical Note
68-year-old female with history of Crohn's disease currently on Humira every 7 days.  She states she is doing well having four bowel movements per day on average.  Those are typically formed in the morning and the little bit looser in the afternoon. Denies abdominal pain or blood in her stool.  Checked routine labs and fecal calprotectin today to see how everything is doing.  Planning for repeat colonoscopy 2026 unless her symptoms worsen.

## 2024-01-23 NOTE — PROGRESS NOTES
"Chief Complaint  Crohn's Disease (Follow up)    Subjective          History of Present Illness    Daisy Escobar is a  68 y.o. female presents for follow-up for Crohn's disease of terminal ileum.  She is a patient of Dr. Patel and is new to me.  Last seen by Dr. Patel in the office on 1/31/2023.    He has been previously treated with Remicade, but switch to Humira because her job prevent her from getting the infusions.  She took Remicade for over a year and continued to have diarrhea and joint pain.  CT enterography 1/21 showed some mucosal enhancement and narrowing in the small intestine.  Last colonoscopy was 8/16.  Patient was changed to weekly Humira dosing in January 2022 due to low adalimumab levels.    Reports she has been doing well. Still taking Humira weekly. She denies abdominal pain, nausea, vomiting, black or bloody stools.  She says she has about 2 bowel movements each morning which are typically formed into bowel movements in the afternoon which are usually a bit looser than her stools in the morning.  She is still taking a B12 supplement.  Reports that she does not have any difficulty swallowing.  She gets reflux once every several months typically after eating spicy foods.  She treats this with Pepcid which alleviates her symptoms.    Colonoscopy 8/2016 showed stenotic end-to-end ileocolonic anastomosis characterized by severe stenosis, single erosion at colonic anastomosis which was biopsied, erythematous mucosa in entire examined colon which was biopsied and nonbleeding internal hemorrhoids.    Objective   Vital Signs:   /93   Pulse 71   Temp 96.1 °F (35.6 °C)   Ht 157.5 cm (62\")   Wt 77.8 kg (171 lb 8 oz)   BMI 31.37 kg/m²       Physical Exam  Constitutional:       General: She is not in acute distress.     Appearance: Normal appearance.   Eyes:      General: No scleral icterus.  Cardiovascular:      Rate and Rhythm: Normal rate.   Pulmonary:      Effort: Pulmonary effort is " normal.   Abdominal:      General: Abdomen is flat. Bowel sounds are normal. There is no distension.      Tenderness: There is no abdominal tenderness. There is no guarding.   Skin:     Coloration: Skin is not jaundiced.   Neurological:      General: No focal deficit present.      Mental Status: She is alert and oriented to person, place, and time.   Psychiatric:         Mood and Affect: Mood normal.         Behavior: Behavior normal.          Result Review :   The following data was reviewed by: Letty Pappas PA-C on 01/23/2024:              Assessment:   Diagnoses and all orders for this visit:    1. Adalimumab (Humira) long-term use (Primary)  -     Vitamin D 25 Hydroxy  -     Vitamin B12  -     Calprotectin, Fecal - Stool, Per Rectum  -     Hepatitis B Surface Antigen  -     QuantiFERON TB Gold  -     CBC & Differential  -     Comprehensive Metabolic Panel  -     Hepatitis B Surface Antibody    2. Vitamin B12 deficiency    3. Crohn's disease of small intestine without complications  -     Vitamin D 25 Hydroxy  -     Vitamin B12  -     Calprotectin, Fecal - Stool, Per Rectum  -     Hepatitis B Surface Antigen  -     QuantiFERON TB Gold  -     CBC & Differential  -     Comprehensive Metabolic Panel  -     Hepatitis B Surface Antibody    4. Encounter for screening for other viral diseases  -     Hepatitis B Surface Antigen  -     Hepatitis B Surface Antibody          Plan:   -Continue Humira every 7 days  -Will check routine labs including hep B/TB  -Fecal calprotectin to assess inflammation  -Repeat colonoscopy 2026 unless symptoms require earlier evaluation.      Follow Up   No follow-ups on file.    Dragon dictation used throughout this note.         Letty Pappas PA-C  [facility]  [address] [place] [address]  Office: [phone]

## 2024-01-24 ENCOUNTER — TELEPHONE (OUTPATIENT)
Dept: GASTROENTEROLOGY | Facility: CLINIC | Age: 69
End: 2024-01-24
Payer: MEDICARE

## 2024-01-24 LAB
25(OH)D3+25(OH)D2 SERPL-MCNC: 34 NG/ML (ref 30–100)
ALBUMIN SERPL-MCNC: 4.2 G/DL (ref 3.9–4.9)
ALBUMIN/GLOB SERPL: 1.4 {RATIO} (ref 1.2–2.2)
ALP SERPL-CCNC: 56 IU/L (ref 44–121)
ALT SERPL-CCNC: 28 IU/L (ref 0–32)
AST SERPL-CCNC: 29 IU/L (ref 0–40)
BASOPHILS # BLD AUTO: 0.1 X10E3/UL (ref 0–0.2)
BASOPHILS NFR BLD AUTO: 1 %
BILIRUB SERPL-MCNC: 0.9 MG/DL (ref 0–1.2)
BUN SERPL-MCNC: 12 MG/DL (ref 8–27)
BUN/CREAT SERPL: 11 (ref 12–28)
CALCIUM SERPL-MCNC: 9.6 MG/DL (ref 8.7–10.3)
CHLORIDE SERPL-SCNC: 101 MMOL/L (ref 96–106)
CO2 SERPL-SCNC: 25 MMOL/L (ref 20–29)
CREAT SERPL-MCNC: 1.14 MG/DL (ref 0.57–1)
EGFRCR SERPLBLD CKD-EPI 2021: 52 ML/MIN/1.73
EOSINOPHIL # BLD AUTO: 0.2 X10E3/UL (ref 0–0.4)
EOSINOPHIL NFR BLD AUTO: 3 %
ERYTHROCYTE [DISTWIDTH] IN BLOOD BY AUTOMATED COUNT: 13.5 % (ref 11.7–15.4)
GLOBULIN SER CALC-MCNC: 2.9 G/DL (ref 1.5–4.5)
GLUCOSE SERPL-MCNC: 97 MG/DL (ref 70–99)
HBV SURFACE AB SER QL: NON REACTIVE
HBV SURFACE AG SERPL QL IA: NEGATIVE
HCT VFR BLD AUTO: 43.4 % (ref 34–46.6)
HGB BLD-MCNC: 14.7 G/DL (ref 11.1–15.9)
IMM GRANULOCYTES # BLD AUTO: 0 X10E3/UL (ref 0–0.1)
IMM GRANULOCYTES NFR BLD AUTO: 0 %
LYMPHOCYTES # BLD AUTO: 2.1 X10E3/UL (ref 0.7–3.1)
LYMPHOCYTES NFR BLD AUTO: 33 %
MCH RBC QN AUTO: 29 PG (ref 26.6–33)
MCHC RBC AUTO-ENTMCNC: 33.9 G/DL (ref 31.5–35.7)
MCV RBC AUTO: 86 FL (ref 79–97)
MONOCYTES # BLD AUTO: 0.4 X10E3/UL (ref 0.1–0.9)
MONOCYTES NFR BLD AUTO: 7 %
NEUTROPHILS # BLD AUTO: 3.6 X10E3/UL (ref 1.4–7)
NEUTROPHILS NFR BLD AUTO: 56 %
PLATELET # BLD AUTO: 260 X10E3/UL (ref 150–450)
POTASSIUM SERPL-SCNC: 4.1 MMOL/L (ref 3.5–5.2)
PROT SERPL-MCNC: 7.1 G/DL (ref 6–8.5)
RBC # BLD AUTO: 5.07 X10E6/UL (ref 3.77–5.28)
SODIUM SERPL-SCNC: 139 MMOL/L (ref 134–144)
VIT B12 SERPL-MCNC: 802 PG/ML (ref 232–1245)
WBC # BLD AUTO: 6.4 X10E3/UL (ref 3.4–10.8)

## 2024-01-24 NOTE — TELEPHONE ENCOUNTER
HUB STAFF ATTEMPTED TO FOLLOW WARM TRANSFER PROCESS BUT WAS UNSUCCESSFUL.     Caller: Daisy Escobar    Relationship: Self    Best call back number: [phone]    What is the best time to reach you: ANYTIME     Who are you requesting to speak with (clinical staff, provider,  specific staff member): CLINICAL    Do you know the name of the person who called: DEMETRA CARRILLO     What was the call regarding: LAB RESULTS     Is it okay if the provider responds through [facility]: EITHER IS FINE

## 2024-01-24 NOTE — PROGRESS NOTES
Please let patient know that her creatinine is a bit high and GFR is low, I recommend she take in proper hydration and follow up with her primary care doctor.   Her labs also show that she is not immune to hepatitis B --> I recommend she go to her pharmacy to get the Hep B vaccine. Otherwise the labs look good so far. Her fecal emily and quantiferon TB are still pending.     Thank you,   Letty Pappas PA-C

## 2024-01-24 NOTE — TELEPHONE ENCOUNTER
----- Message from Letty Pappas PA-C sent at 1/24/2024  3:18 PM EST -----  Please let patient know that her creatinine is a bit high and GFR is low, I recommend she take in proper hydration and follow up with her primary care doctor.   Her labs also show that she is not immune to hepatitis B --> I recommend she go to her pharmacy to get the Hep B vaccine. Otherwise the labs look good so far. Her fecal emily and quantiferon TB are still pending.     Thank you,   Letty Pappas PA-C

## 2024-01-26 ENCOUNTER — TELEPHONE (OUTPATIENT)
Dept: GASTROENTEROLOGY | Facility: CLINIC | Age: 69
End: 2024-01-26

## 2024-01-26 LAB
GAMMA INTERFERON BACKGROUND BLD IA-ACNC: 0 IU/ML
M TB IFN-G BLD-IMP: NEGATIVE
M TB IFN-G CD4+ T-CELLS BLD-ACNC: 0.03 IU/ML
M TBIFN-G CD4+ CD8+T-CELLS BLD-ACNC: 0 IU/ML
MITOGEN IGNF BLD-ACNC: >10 IU/ML
QUANTIFERON INCUBATION: NORMAL
SERVICE CMNT-IMP: NORMAL

## 2024-01-26 NOTE — TELEPHONE ENCOUNTER
Hub staff attempted to follow warm transfer process and was unsuccessful     Caller: KATIE    Relationship to patient: LAB    Best call back number:   [phone] OPTION 7       Patient is needing: [facility] NEEDS AN ORDER FAXED OVER FOR THE   Calprotectin, Fecal - Stool, Per Rectum.  THE PAPERWORK THE PATIENT HAS DOES NOT HAVE ALL OF THE CORRECT INFORMATION.      FAX #:  [phone]     CLOSED FOR LUNCH FROM 12PM - 1PM.

## 2024-01-31 LAB — CALPROTECTIN STL-MCNT: 330 UG/G (ref 0–120)

## 2024-02-01 ENCOUNTER — TELEPHONE (OUTPATIENT)
Dept: GASTROENTEROLOGY | Facility: CLINIC | Age: 69
End: 2024-02-01
Payer: MEDICARE

## 2024-02-01 DIAGNOSIS — K50.00 CROHN'S DISEASE OF SMALL INTESTINE WITHOUT COMPLICATIONS: ICD-10-CM

## 2024-02-01 DIAGNOSIS — Z79.620 ADALIMUMAB (HUMIRA) LONG-TERM USE: Primary | ICD-10-CM

## 2024-02-01 NOTE — TELEPHONE ENCOUNTER
----- Message from Letty Pappas PA-C sent at 2/1/2024  8:09 AM EST -----  Please let patient know her fecal calprotectin levels are elevated which can be a sign of intestinal inflammation. I'd like to recheck her Humira levels just before her next injection - I went ahead and placed orders for the labs

## 2024-02-01 NOTE — PROGRESS NOTES
Please let patient know her fecal calprotectin levels are elevated which can be a sign of intestinal inflammation. I'd like to recheck her Humira levels just before her next injection - I went ahead and placed orders for the labs

## 2024-02-15 ENCOUNTER — LAB (OUTPATIENT)
Dept: LAB | Facility: HOSPITAL | Age: 69
End: 2024-02-15
Payer: MEDICARE

## 2024-02-15 PROCEDURE — 80145 DRUG ASSAY ADALIMUMAB: CPT

## 2024-02-15 PROCEDURE — 82397 CHEMILUMINESCENT ASSAY: CPT

## 2024-03-15 DIAGNOSIS — R19.5 ELEVATED FECAL CALPROTECTIN: ICD-10-CM

## 2024-03-15 DIAGNOSIS — K50.00 CROHN'S DISEASE OF SMALL INTESTINE WITHOUT COMPLICATIONS: ICD-10-CM

## 2024-03-15 DIAGNOSIS — Z79.620 ADALIMUMAB (HUMIRA) LONG-TERM USE: Primary | ICD-10-CM

## 2024-03-27 ENCOUNTER — TELEPHONE (OUTPATIENT)
Dept: GASTROENTEROLOGY | Facility: CLINIC | Age: 69
End: 2024-03-27
Payer: MEDICARE

## 2024-03-27 PROBLEM — Z79.620 ADALIMUMAB (HUMIRA) LONG-TERM USE: Status: ACTIVE | Noted: 2024-03-15

## 2024-03-27 PROBLEM — R19.5 ELEVATED FECAL CALPROTECTIN: Status: ACTIVE | Noted: 2024-03-15

## 2024-03-27 PROBLEM — K50.00 CROHN'S DISEASE OF SMALL INTESTINE WITHOUT COMPLICATIONS: Status: ACTIVE | Noted: 2024-03-15

## 2024-07-23 ENCOUNTER — ANESTHESIA (OUTPATIENT)
Dept: GASTROENTEROLOGY | Facility: HOSPITAL | Age: 69
End: 2024-07-23
Payer: MEDICARE

## 2024-07-23 ENCOUNTER — HOSPITAL ENCOUNTER (OUTPATIENT)
Facility: HOSPITAL | Age: 69
Setting detail: HOSPITAL OUTPATIENT SURGERY
Discharge: HOME | End: 2024-07-23
Attending: INTERNAL MEDICINE | Admitting: INTERNAL MEDICINE
Payer: MEDICARE

## 2024-07-23 ENCOUNTER — ANESTHESIA EVENT (OUTPATIENT)
Dept: GASTROENTEROLOGY | Facility: HOSPITAL | Age: 69
End: 2024-07-23
Payer: MEDICARE

## 2024-07-23 VITALS
TEMPERATURE: 97.9 F | BODY MASS INDEX: 30.47 KG/M2 | RESPIRATION RATE: 16 BRPM | WEIGHT: 161.4 LBS | HEIGHT: 61 IN | SYSTOLIC BLOOD PRESSURE: 118 MMHG | OXYGEN SATURATION: 95 % | DIASTOLIC BLOOD PRESSURE: 74 MMHG | HEART RATE: 72 BPM

## 2024-07-23 DIAGNOSIS — K50.00 CROHN'S DISEASE OF SMALL INTESTINE WITHOUT COMPLICATIONS: ICD-10-CM

## 2024-07-23 DIAGNOSIS — R19.5 ELEVATED FECAL CALPROTECTIN: ICD-10-CM

## 2024-07-23 DIAGNOSIS — K50.80 CROHN'S DISEASE OF BOTH SMALL AND LARGE INTESTINE WITHOUT COMPLICATION: Primary | ICD-10-CM

## 2024-07-23 DIAGNOSIS — Z79.620 ADALIMUMAB (HUMIRA) LONG-TERM USE: ICD-10-CM

## 2024-07-23 PROCEDURE — 25810000003 LACTATED RINGERS PER 1000 ML: Performed by: NURSE ANESTHETIST, CERTIFIED REGISTERED

## 2024-07-23 PROCEDURE — 25810000003 LACTATED RINGERS PER 1000 ML: Performed by: INTERNAL MEDICINE

## 2024-07-23 PROCEDURE — S0260 H&P FOR SURGERY: HCPCS | Performed by: INTERNAL MEDICINE

## 2024-07-23 PROCEDURE — 25010000002 PROPOFOL 200 MG/20ML EMULSION: Performed by: NURSE ANESTHETIST, CERTIFIED REGISTERED

## 2024-07-23 PROCEDURE — 45380 COLONOSCOPY AND BIOPSY: CPT | Performed by: INTERNAL MEDICINE

## 2024-07-23 PROCEDURE — 25010000002 PROPOFOL 1000 MG/100ML EMULSION: Performed by: NURSE ANESTHETIST, CERTIFIED REGISTERED

## 2024-07-23 PROCEDURE — 88305 TISSUE EXAM BY PATHOLOGIST: CPT | Performed by: INTERNAL MEDICINE

## 2024-07-23 RX ORDER — SODIUM CHLORIDE, SODIUM LACTATE, POTASSIUM CHLORIDE, CALCIUM CHLORIDE 600; 310; 30; 20 MG/100ML; MG/100ML; MG/100ML; MG/100ML
INJECTION, SOLUTION INTRAVENOUS CONTINUOUS PRN
Status: DISCONTINUED | OUTPATIENT
Start: 2024-07-23 | End: 2024-07-23 | Stop reason: SURG

## 2024-07-23 RX ORDER — LIDOCAINE HYDROCHLORIDE 20 MG/ML
INJECTION, SOLUTION INFILTRATION; PERINEURAL AS NEEDED
Status: DISCONTINUED | OUTPATIENT
Start: 2024-07-23 | End: 2024-07-23 | Stop reason: SURG

## 2024-07-23 RX ORDER — PROPOFOL 10 MG/ML
INJECTION, EMULSION INTRAVENOUS CONTINUOUS PRN
Status: DISCONTINUED | OUTPATIENT
Start: 2024-07-23 | End: 2024-07-23 | Stop reason: SURG

## 2024-07-23 RX ORDER — SODIUM CHLORIDE, SODIUM LACTATE, POTASSIUM CHLORIDE, CALCIUM CHLORIDE 600; 310; 30; 20 MG/100ML; MG/100ML; MG/100ML; MG/100ML
30 INJECTION, SOLUTION INTRAVENOUS CONTINUOUS PRN
Status: DISCONTINUED | OUTPATIENT
Start: 2024-07-23 | End: 2024-07-23 | Stop reason: HOSPADM

## 2024-07-23 RX ORDER — PROPOFOL 10 MG/ML
INJECTION, EMULSION INTRAVENOUS AS NEEDED
Status: DISCONTINUED | OUTPATIENT
Start: 2024-07-23 | End: 2024-07-23 | Stop reason: SURG

## 2024-07-23 RX ADMIN — SODIUM CHLORIDE, SODIUM LACTATE, POTASSIUM CHLORIDE, AND CALCIUM CHLORIDE: 600; 310; 30; 20 INJECTION, SOLUTION INTRAVENOUS at 13:56

## 2024-07-23 RX ADMIN — PROPOFOL INJECTABLE EMULSION 60 MG: 10 INJECTION, EMULSION INTRAVENOUS at 14:01

## 2024-07-23 RX ADMIN — LIDOCAINE HYDROCHLORIDE 80 MG: 20 INJECTION, SOLUTION INFILTRATION; PERINEURAL at 14:01

## 2024-07-23 RX ADMIN — PROPOFOL 200 MCG/KG/MIN: 10 INJECTION, EMULSION INTRAVENOUS at 14:00

## 2024-07-23 RX ADMIN — SODIUM CHLORIDE, POTASSIUM CHLORIDE, SODIUM LACTATE AND CALCIUM CHLORIDE 30 ML/HR: 600; 310; 30; 20 INJECTION, SOLUTION INTRAVENOUS at 12:55

## 2024-07-23 RX ADMIN — PROPOFOL INJECTABLE EMULSION 20 MG: 10 INJECTION, EMULSION INTRAVENOUS at 14:09

## 2024-07-23 NOTE — H&P
[facility]  Pre Procedure History & Physical    Chief Complaint:   screening    Subjective     HPI:   68 yo here today for colonoscopy.  Pt reports no FH CRC/polyps.  Patient denies GI symptoms currently.  H/o CD small intestine.Last exam 2016.  She c/o intermittent abdominal pain - associated with n/v.      Past Medical History:   Past Medical History:   Diagnosis Date    Crohn's disease     Diabetes mellitus     PONV (postoperative nausea and vomiting)        Past Surgical History:  Past Surgical History:   Procedure Laterality Date    APPENDECTOMY      CHOLECYSTECTOMY OPEN      COLON RESECTION      COLONOSCOPY N/A 8/2/2016    stenotic end  to  end ileo colonic anastomsis, severe stenosis, a single erosion at colonic anastmosis, erythematous mucosa in colon, NBIH, patchy edema    OVARIAN CYST SURGERY         Family History:  Family History   Problem Relation Age of Onset    Malig Hyperthermia Neg Hx        Social History:   reports that she quit smoking about 40 years ago. Her smoking use included cigarettes. She started smoking about 45 years ago. She has a 1.3 pack-year smoking history. She has never used smokeless tobacco. She reports that she does not drink alcohol and does not use drugs.    Medications:   Medications Prior to Admission   Medication Sig Dispense Refill Last Dose    Adalimumab (HUMIRA) 40 MG/0.4ML Pen-injector Kit Inject  under the skin into the appropriate area as directed Every 7 (Seven) Days. (Patient taking differently: Inject 40 mg under the skin into the appropriate area as directed Every 7 (Seven) Days. SUNDAY) 12 each 3 7/21/2024    Calcium Carbonate-Vit D-Min (Calcium 600+D3 Plus Minerals) 600-800 MG-UNIT chewable tablet Chew 1 tablet Every 12 (Twelve) Hours.   7/21/2024    potassium chloride (K-DUR,KLOR-CON) 10 MEQ CR tablet Take 1 tablet by mouth 2 (Two) Times a Day.   7/22/2024    vitamin B-12 (CYANOCOBALAMIN) 1000 MCG tablet Take 1 tablet by mouth Daily. 90  "tablet 0 7/22/2024    hyoscyamine (LEVSIN) 0.125 MG SL tablet Take 1 tablet by mouth Every 4 (Four) Hours As Needed for Cramping. 60 tablet 3 More than a month       Allergies:  Azathioprine and Budesonide    ROS:    Pertinent items are noted in HPI     Objective     Blood pressure 141/86, pulse 85, temperature 97.9 °F (36.6 °C), temperature source Oral, resp. rate 16, height 154.9 cm (61\"), weight 73.2 kg (161 lb 6.4 oz), SpO2 96%.    Physical Exam   Constitutional: Pt is oriented to person, place, and time and well-developed, well-nourished, and in no distress.   Mouth/Throat: Oropharynx is clear and moist.   Neck: Normal range of motion.   Cardiovascular: Normal rate, regular rhythm    Pulmonary/Chest: Effort normal    Abdominal: Soft. Nontender  Skin: Skin is warm and dry.   Psychiatric: Mood, memory, affect and judgment normal.     Assessment & Plan     Diagnosis:  Screening for colon cancer    Anticipated Surgical Procedure:  colonoscopy    The risks, benefits, and alternatives of this procedure have been discussed with the patient or the responsible party- the patient understands and agrees to proceed.                                                              "

## 2024-07-23 NOTE — ANESTHESIA PREPROCEDURE EVALUATION
Anesthesia Evaluation     Patient summary reviewed and Nursing notes reviewed   NPO Solid Status: > 8 hours             Airway   Mallampati: I  TM distance: <3 FB  Neck ROM: full  no difficulty expected  Dental - normal exam     Pulmonary - negative pulmonary ROS and normal exam   Cardiovascular - negative cardio ROS and normal exam        Neuro/Psych- negative ROS  GI/Hepatic/Renal/Endo    (+) obesity, diabetes mellitus type 2    Musculoskeletal (-) negative ROS    Abdominal  - normal exam    Bowel sounds: normal.   Substance History - negative use     OB/GYN negative ob/gyn ROS         Other                          Anesthesia Plan    ASA 2     MAC     intravenous induction     Anesthetic plan, risks, benefits, and alternatives have been provided, discussed and informed consent has been obtained with: patient.

## 2024-07-27 LAB
LAB AP CASE REPORT: NORMAL
PATH REPORT.FINAL DX SPEC: NORMAL
PATH REPORT.GROSS SPEC: NORMAL

## 2024-07-28 NOTE — ANESTHESIA POSTPROCEDURE EVALUATION
"Patient: Daisy Escobar    Procedure Summary       Date: 07/23/24 Room / Location:  [facility]    Anesthesia Start: 1356 Anesthesia Stop: 1418    Procedure: COLONOSCOPY with biopies Diagnosis:       Adalimumab (Humira) long-term use      Crohn's disease of small intestine without complications      Elevated fecal calprotectin      (Adalimumab (Humira) long-term use [Z79.620])      (Crohn's disease of small intestine without complications [K50.00])      (Elevated fecal calprotectin [R19.5])    Surgeons: Patricia Patel MD Provider: Zachary Taylor MD    Anesthesia Type: MAC ASA Status: 2            Anesthesia Type: MAC    Vitals  Vitals Value Taken Time   /74 07/23/24 1440   Temp     Pulse 72 07/23/24 1440   Resp 16 07/23/24 1440   SpO2 95 % 07/23/24 1440           Post Anesthesia Care and Evaluation    Patient location during evaluation: bedside  Patient participation: complete - patient participated  Level of consciousness: awake and alert  Pain management: adequate    Airway patency: patent  Anesthetic complications: No anesthetic complications    Cardiovascular status: acceptable  Respiratory status: acceptable  Hydration status: acceptable    Comments: /74 (BP Location: Left arm, Patient Position: Sitting)   Pulse 72   Temp 36.6 °C (97.9 °F) (Oral)   Resp 16   Ht 154.9 cm (61\")   Wt 73.2 kg (161 lb 6.4 oz)   SpO2 95%   BMI 30.50 kg/m²     "

## 2024-07-29 NOTE — PROGRESS NOTES
Colon biopsies showed no evidence of active crohn's disease.  Recommend MR enterography for further evaluation of intestinal stricture (narrowing).  Please contact radiology to schedule.  Schedule office visit with me in 8 weeks to discuss results/management (I have tentatively scheduled for 9/30-pls confirm with her)

## 2024-07-31 ENCOUNTER — TELEPHONE (OUTPATIENT)
Dept: GASTROENTEROLOGY | Facility: CLINIC | Age: 69
End: 2024-07-31
Payer: MEDICARE

## 2024-07-31 NOTE — TELEPHONE ENCOUNTER
----- Message from Patricia Patel sent at 7/29/2024  4:51 PM EDT -----  Colon biopsies showed no evidence of active crohn's disease.  Recommend MR enterography for further evaluation of intestinal stricture (narrowing).  Please contact radiology to schedule.  Schedule office visit with me in 8 weeks to discuss results/management (I have tentatively scheduled for 9/30-pls confirm with her)

## 2024-08-08 ENCOUNTER — HOSPITAL ENCOUNTER (OUTPATIENT)
Dept: MRI IMAGING | Facility: HOSPITAL | Age: 69
Discharge: HOME | End: 2024-08-08
Admitting: INTERNAL MEDICINE
Payer: MEDICARE

## 2024-08-08 DIAGNOSIS — K50.80 CROHN'S DISEASE OF BOTH SMALL AND LARGE INTESTINE WITHOUT COMPLICATION: ICD-10-CM

## 2024-08-08 PROCEDURE — 82565 ASSAY OF CREATININE: CPT

## 2024-08-08 PROCEDURE — 74183 MRI ABD W/O CNTR FLWD CNTR: CPT

## 2024-08-08 PROCEDURE — 0 GADOBENATE DIMEGLUMINE 529 MG/ML SOLUTION: Performed by: INTERNAL MEDICINE

## 2024-08-08 PROCEDURE — A9577 INJ MULTIHANCE: HCPCS | Performed by: INTERNAL MEDICINE

## 2024-08-08 PROCEDURE — 72197 MRI PELVIS W/O & W/DYE: CPT

## 2024-08-08 RX ADMIN — GADOBENATE DIMEGLUMINE 15 ML: 529 INJECTION, SOLUTION INTRAVENOUS at 09:50

## 2024-08-09 LAB — CREAT BLDA-MCNC: 0.6 MG/DL (ref 0.6–1.3)

## 2024-08-20 ENCOUNTER — TELEPHONE (OUTPATIENT)
Dept: GASTROENTEROLOGY | Facility: CLINIC | Age: 69
End: 2024-08-20
Payer: MEDICARE

## 2024-08-20 NOTE — TELEPHONE ENCOUNTER
----- Message from Patricia Patel sent at 8/20/2024 12:05 PM EDT -----  MRI of the intestine shows no real change from previous imaging.  Her recent colonoscopic finding showed a stricture however this was seen on her 2016 exam.  I think the stricture is more fibrotic (scarring) than inflammatory and likely is chronic and will be unchanged with medication.  Recommend continue Humira for now-can discuss whether she wishes to continue this or pursue alternative medicines based on her symptoms at her upcoming follow-up with Letty

## 2024-08-23 ENCOUNTER — TELEPHONE (OUTPATIENT)
Dept: GASTROENTEROLOGY | Facility: CLINIC | Age: 69
End: 2024-08-23
Payer: MEDICARE

## 2024-08-23 RX ORDER — ONDANSETRON 4 MG/1
4 TABLET, ORALLY DISINTEGRATING ORAL EVERY 8 HOURS PRN
Qty: 25 TABLET | Refills: 0 | Status: SHIPPED | OUTPATIENT
Start: 2024-08-23

## 2024-08-23 NOTE — TELEPHONE ENCOUNTER
Patient said has been vomiting since 3 am and hasn't taken anything for it can she have something called in     Please advise

## 2024-08-23 NOTE — TELEPHONE ENCOUNTER
Pt stated since 3 am this morning, she has been throwing up and having diarrhea.  She said it feels a little different then GI symptoms she has had in the past.  She denies temp, or blood.  Stated every time she throws up she has diarrhea as well.  She tried to eat some ice, but as soon as she did she threw it back up.    Reinforced need to try and keep herself hydrated, advised she may need to go to the ER if she is unable to keep liquids down.  She is hesitant to do this.  Is requesting we call in something to [facility] for her nausea

## 2024-08-23 NOTE — TELEPHONE ENCOUNTER
I have called the pt and passed on Dr Patel's recommendations and the pt verbalized understanding.  She is starting to feel better and has been able to get down some ice chips.

## 2024-08-23 NOTE — TELEPHONE ENCOUNTER
Zofran sent to pharmacy - try to take this and then reattempt sips water later this afternoon in order to maintain hydration but if she is unable to do so by tomorrow, should consider er visit if this persists for hydration

## 2024-10-08 ENCOUNTER — OFFICE VISIT (OUTPATIENT)
Dept: GASTROENTEROLOGY | Facility: CLINIC | Age: 69
End: 2024-10-08
Payer: MEDICARE

## 2024-10-08 VITALS
DIASTOLIC BLOOD PRESSURE: 84 MMHG | WEIGHT: 163.7 LBS | SYSTOLIC BLOOD PRESSURE: 131 MMHG | BODY MASS INDEX: 30.91 KG/M2 | HEART RATE: 76 BPM | HEIGHT: 61 IN

## 2024-10-08 DIAGNOSIS — K50.80 CROHN'S DISEASE OF BOTH SMALL AND LARGE INTESTINE WITHOUT COMPLICATION: Primary | ICD-10-CM

## 2024-10-08 DIAGNOSIS — Z79.620 ADALIMUMAB (HUMIRA) LONG-TERM USE: ICD-10-CM

## 2024-10-08 NOTE — PROGRESS NOTES
"Chief Complaint  Adalimumab (Humira) long-term use and Crohn's Disease    Subjective          History of Present Illness    Daisy Escobar is a  69 y.o. female presents for follow-up for Crohn's disease of terminal ileum.  She is a patient of Dr. Patel and was last seen by me 1/23/2024.    He has been previously treated with Remicade, but switch to Humira because her job prevent her from getting the infusions.  She took Remicade for over a year and continued to have diarrhea and joint pain.  CT enterography 1/21 showed some mucosal enhancement and narrowing in the small intestine.  Last colonoscopy was 8/16.  Patient was changed to weekly Humira dosing in January 2022 due to low adalimumab levels.     Colonoscopy 7/23/2024 showed granularity in entire examined colon, nonbleeding internal hemorrhoids, end-to-side ileocolonic anastomosis characterized by inflammation and severe stenosis.    MR Enterography completed 8/8/2024 showed no real change from previous imaging.    She reports she has been doing well.  She states that just after colonoscopy she developed abdominal pain, nausea, vomiting and diarrhea which lasted a few days but has since resolved.  She has not had these issues since and states that she might have had a stomach virus.  She is having regular bowel movements and denies black or bloody stool.  She reports that she feels like Humira is working pretty well.    Objective   Vital Signs:   /84 (BP Location: Left arm, Patient Position: Sitting, Cuff Size: Adult)   Pulse 76   Ht 154.9 cm (61\")   Wt 74.3 kg (163 lb 11.2 oz)   BMI 30.93 kg/m²       Physical Exam  Constitutional:       General: She is not in acute distress.     Appearance: Normal appearance.   Eyes:      General: No scleral icterus.  Cardiovascular:      Rate and Rhythm: Normal rate.   Abdominal:      General: Abdomen is flat. There is no distension.      Tenderness: There is no abdominal tenderness. There is no guarding. "   Skin:     Coloration: Skin is not jaundiced.   Neurological:      General: No focal deficit present.      Mental Status: She is alert and oriented to person, place, and time.   Psychiatric:         Mood and Affect: Mood normal.         Behavior: Behavior normal.          Result Review :   The following data was reviewed by: Letty Pappas PA-C on 10/08/2024:  CMP          1/23/2024    13:27 8/8/2024    09:13   CMP   Glucose 97     BUN 12     Creatinine 1.14  0.60    Sodium 139     Potassium 4.1     Chloride 101     Calcium 9.6     Total Protein 7.1     Albumin 4.2     Globulin 2.9     Total Bilirubin 0.9     Alkaline Phosphatase 56     AST (SGOT) 29     ALT (SGPT) 28     BUN/Creatinine Ratio 11       CBC          1/23/2024    13:27   CBC   WBC 6.4    RBC 5.07    Hemoglobin 14.7    Hematocrit 43.4    MCV 86    MCH 29.0    MCHC 33.9    RDW 13.5    Platelets 260              Assessment:   Diagnoses and all orders for this visit:    1. Crohn's disease of both small and large intestine without complication (Primary)    2. Adalimumab (Humira) long-term use          Plan:   -Discussed staying on Humira versus other treatment options and patient states she would like to continue with Humira for now as she is overall doing relatively well with it  -Patient will be due for routine labs in January      Follow Up   No follow-ups on file.    Dragon dictation used throughout this note.         Letty Pappas PA-C  [facility]  [address] [place] [address]  Office: [phone]

## 2024-10-08 NOTE — Clinical Note
This is 69-year-old female with history of Crohn's disease of small and large intestine.  She recently underwent colonoscopy showing granularity in entire colon and end-to-side ileocolonic anastomosis with inflammation and severe stenosis.  Subsequent MR enterography showed findings which were stable with prior imaging and it was believed that the stricturing was due to prior surgery and not so much as a result of her Crohn's disease.  We discussed with her she would like to remain on Humira today or try alternative medications.  She states that she feels like she is doing well overall and would like to stick with Humira for now

## 2024-12-11 DIAGNOSIS — K50.80 CROHN'S DISEASE OF BOTH SMALL AND LARGE INTESTINE WITHOUT COMPLICATION: Primary | ICD-10-CM

## 2024-12-11 RX ORDER — ADALIMUMAB 40MG/0.4ML
40 KIT SUBCUTANEOUS
Qty: 12 EACH | Refills: 3 | Status: SHIPPED | OUTPATIENT
Start: 2024-12-11

## 2025-01-15 ENCOUNTER — SPECIALTY PHARMACY (OUTPATIENT)
Dept: GASTROENTEROLOGY | Facility: CLINIC | Age: 70
End: 2025-01-15
Payer: MEDICARE

## 2025-01-15 NOTE — PROGRESS NOTES
Specialty Pharmacy     Humira PAP denied  Humira PA submitted    Cyltezo preferred  Cyltezo PA submitted      Vicky Rivera  Specialty Pharmacy Technician

## 2025-01-17 ENCOUNTER — SPECIALTY PHARMACY (OUTPATIENT)
Dept: GASTROENTEROLOGY | Facility: CLINIC | Age: 70
End: 2025-01-17
Payer: MEDICARE

## 2025-01-17 NOTE — PROGRESS NOTES
Specialty Pharmacy     Cyltezo $1908.40   Sent text and email to Daisy for permission to send Cyltezo PAP 1/17      Vicky Rivera  Specialty Pharmacy Technician

## 2025-01-22 ENCOUNTER — OFFICE VISIT (OUTPATIENT)
Dept: GASTROENTEROLOGY | Facility: CLINIC | Age: 70
End: 2025-01-22
Payer: MEDICARE

## 2025-01-22 VITALS
HEIGHT: 61 IN | DIASTOLIC BLOOD PRESSURE: 86 MMHG | WEIGHT: 167.9 LBS | HEART RATE: 84 BPM | SYSTOLIC BLOOD PRESSURE: 140 MMHG | BODY MASS INDEX: 31.7 KG/M2

## 2025-01-22 DIAGNOSIS — K50.80 CROHN'S DISEASE OF BOTH SMALL AND LARGE INTESTINE WITHOUT COMPLICATION: Primary | ICD-10-CM

## 2025-01-22 DIAGNOSIS — Z79.620 ADALIMUMAB (HUMIRA) LONG-TERM USE: ICD-10-CM

## 2025-01-22 DIAGNOSIS — Z11.59 ENCOUNTER FOR SCREENING FOR OTHER VIRAL DISEASES: ICD-10-CM

## 2025-01-22 NOTE — PROGRESS NOTES
"Chief Complaint  Crohn's disease of both small and large intestine without c    Subjective          History of Present Illness    Daisy Escobar is a  69 y.o. female presents for follow-up for Crohn's disease of terminal ileum.  She is a patient of Dr. Patel was last seen by me 10/8/2024.    He has been previously treated with Remicade, but switch to Humira because her job prevent her from getting the infusions.  She took Remicade for over a year and continued to have diarrhea and joint pain.  CT enterography 1/21 showed some mucosal enhancement and narrowing in the small intestine.  Last colonoscopy was 8/16.  Patient was changed to weekly Humira dosing in January 2022 due to low adalimumab levels.     Reports she has been doing really well.  No abdominal pain, urgency, black or bloody stool.  She tolerates diet well.  She was taking Humira weekly.  She took her last dose last Saturday.  She states that she has been having some difficulty getting the medication approved with insurance and is currently in the middle of that process right now.  No unintentional weight loss.    Colonoscopy 7/23/2024 showed granularity in entire examined colon, nonbleeding internal hemorrhoids, end-to-side ileocolonic anastomosis characterized by inflammation and severe stenosis.     MR Enterography completed 8/8/2024 showed no real change from previous imaging.    Objective   Vital Signs:   /86 (BP Location: Left arm, Patient Position: Sitting, Cuff Size: Adult)   Pulse 84   Ht 154.9 cm (61\")   Wt 76.2 kg (167 lb 14.4 oz)   BMI 31.72 kg/m²       Physical Exam  Constitutional:       General: She is not in acute distress.     Appearance: Normal appearance.   Eyes:      General: No scleral icterus.  Pulmonary:      Effort: Pulmonary effort is normal.   Abdominal:      General: Abdomen is flat. There is no distension.      Tenderness: There is no abdominal tenderness. There is no guarding.   Skin:     Coloration: Skin is " not jaundiced.   Neurological:      General: No focal deficit present.      Mental Status: She is alert and oriented to person, place, and time.   Psychiatric:         Mood and Affect: Mood normal.         Behavior: Behavior normal.          Result Review :   The following data was reviewed by: Letty Pappas PA-C on 01/22/2025:  CMP          8/8/2024    09:13   CMP   Creatinine 0.60                Assessment:   Diagnoses and all orders for this visit:    1. Crohn's disease of both small and large intestine without complication (Primary)  -     Hepatitis B Surface Antibody; Future  -     Hepatitis B Surface Antigen; Future  -     Calprotectin, Fecal - Stool, Per Rectum; Future  -     Comprehensive Metabolic Panel; Future  -     CBC (No Diff); Future  -     Iron Profile; Future  -     Ferritin; Future  -     Vitamin D 25 Hydroxy; Future  -     Vitamin B12; Future  -     QuantiFERON TB Gold; Future    2. Adalimumab (Humira) long-term use  -     Hepatitis B Surface Antibody; Future  -     Hepatitis B Surface Antigen; Future  -     Calprotectin, Fecal - Stool, Per Rectum; Future  -     Comprehensive Metabolic Panel; Future  -     CBC (No Diff); Future  -     Iron Profile; Future  -     Ferritin; Future  -     Vitamin D 25 Hydroxy; Future  -     Vitamin B12; Future  -     QuantiFERON TB Gold; Future    3. Encounter for screening for other viral diseases  -     Hepatitis B Surface Antibody; Future  -     Hepatitis B Surface Antigen; Future          Plan:   -In the process of getting biosimilar approved will be in close communication w/ pharmacy team to make sure she is able to get this  -Due for routine labs - will order and she will have them drawn at upcoming lab appointment where she is getting labs from her PCP drawn as well.   -Will also check fecal calprotectin      Follow Up   Return in about 6 months (around 7/22/2025).    Dragon dictation used throughout this note.         Letty Pappas PA-C  [facility]  [address] [place] [address]  Office: [phone]

## 2025-01-27 ENCOUNTER — SPECIALTY PHARMACY (OUTPATIENT)
Dept: GASTROENTEROLOGY | Facility: CLINIC | Age: 70
End: 2025-01-27
Payer: MEDICARE

## 2025-01-27 NOTE — TELEPHONE ENCOUNTER
LINDA iLma requested Cyltezo prescription sent to [facility] (NPI [id number])

## 2025-01-27 NOTE — PROGRESS NOTES
Specialty Pharmacy     Daisy received the notice that the provider has to resign the Cyltezo prescription order - pended to provider to sign this morning     Vicky Rivera  Specialty Pharmacy Technician

## 2025-01-29 RX ORDER — ADALIMUMAB-ADBM 40MG/0.4ML
40 KIT SUBCUTANEOUS
Qty: 4 EACH | Refills: 11 | Status: SHIPPED | OUTPATIENT
Start: 2025-01-29

## 2025-02-07 ENCOUNTER — TELEPHONE (OUTPATIENT)
Dept: GASTROENTEROLOGY | Facility: CLINIC | Age: 70
End: 2025-02-07
Payer: MEDICARE

## 2025-02-07 NOTE — TELEPHONE ENCOUNTER
Provider: KIRILL DAVIDSON    Caller: Daisy Escobar    Relationship to Patient: Self    Reason for Call: PATIENT WOULD LIKE TO RUN BY THE OFFICE MONDAY TO  KIT FOR SAMPLE SHE NEEDS TO

## 2025-02-07 NOTE — TELEPHONE ENCOUNTER
Pt is responding to your missing stool study note that was sent via my chart.    Please put the stool kits up front for her

## 2025-03-11 ENCOUNTER — SPECIALTY PHARMACY (OUTPATIENT)
Dept: GASTROENTEROLOGY | Facility: CLINIC | Age: 70
End: 2025-03-11
Payer: MEDICARE

## 2025-03-11 NOTE — PROGRESS NOTES
Patient received 1 month of free drug Cyltezo from [facility] by error - confirmed with Alexa at [facility] that PAP application is denied      [facility] denied application for free drug Cyltezo - requires W2, 1040, or social security award     Vicky Rivera  3/11/2025  11:08 EDT

## 2025-03-20 ENCOUNTER — SPECIALTY PHARMACY (OUTPATIENT)
Dept: GASTROENTEROLOGY | Facility: CLINIC | Age: 70
End: 2025-03-20
Payer: MEDICARE

## (undated) DEVICE — KT ORCA ORCAPOD DISP STRL

## (undated) DEVICE — SINGLE-USE BIOPSY FORCEPS: Brand: RADIAL JAW 4

## (undated) DEVICE — SENSR O2 OXIMAX FNGR A/ 18IN NONSTR

## (undated) DEVICE — LN SMPL CO2 SHTRM SD STREAM W/M LUER

## (undated) DEVICE — TUBING, SUCTION, 1/4" X 10', STRAIGHT: Brand: MEDLINE

## (undated) DEVICE — CANN O2 ETCO2 FITS ALL CONN CO2 SMPL A/ 7IN DISP LF

## (undated) DEVICE — ADAPT CLN BIOGUARD AIR/H2O DISP